# Patient Record
Sex: FEMALE | Race: WHITE | NOT HISPANIC OR LATINO | Employment: UNEMPLOYED | URBAN - METROPOLITAN AREA
[De-identification: names, ages, dates, MRNs, and addresses within clinical notes are randomized per-mention and may not be internally consistent; named-entity substitution may affect disease eponyms.]

---

## 2017-01-10 ENCOUNTER — GENERIC CONVERSION - ENCOUNTER (OUTPATIENT)
Dept: OTHER | Facility: OTHER | Age: 52
End: 2017-01-10

## 2017-01-25 ENCOUNTER — GENERIC CONVERSION - ENCOUNTER (OUTPATIENT)
Dept: OTHER | Facility: OTHER | Age: 52
End: 2017-01-25

## 2017-02-22 ENCOUNTER — GENERIC CONVERSION - ENCOUNTER (OUTPATIENT)
Dept: OTHER | Facility: OTHER | Age: 52
End: 2017-02-22

## 2017-03-22 ENCOUNTER — GENERIC CONVERSION - ENCOUNTER (OUTPATIENT)
Dept: OTHER | Facility: OTHER | Age: 52
End: 2017-03-22

## 2017-06-13 ENCOUNTER — GENERIC CONVERSION - ENCOUNTER (OUTPATIENT)
Dept: OTHER | Facility: OTHER | Age: 52
End: 2017-06-13

## 2017-11-09 ENCOUNTER — GENERIC CONVERSION - ENCOUNTER (OUTPATIENT)
Dept: OTHER | Facility: OTHER | Age: 52
End: 2017-11-09

## 2017-11-20 ENCOUNTER — GENERIC CONVERSION - ENCOUNTER (OUTPATIENT)
Dept: OTHER | Facility: OTHER | Age: 52
End: 2017-11-20

## 2018-01-09 NOTE — PROGRESS NOTES
Medical Alert: Jaw Pain    Frequent Headaches    Other  Medications: Amoxicillin 500mg    Percocet 5mg/325mg    Vicodin 5mg/300mg    Amoxicillin 500mg    Percocet 5mg/325mg    Percocet 5mg/325mg    Percocet 5mg/325mg    Amoxicillin 500mg    Motrin 600 mg    Percocet 5mg/325mg    Amoxicillin 500 MG    Vicodin 5mg/300mg    Peridex 16 oz    Percocet 5mg/325mg    Motrin 600 mg    Amoxicillin 500mg    Motrin 600 mg    Vicodin 5mg/300mg    PreviDent 5000 Booster 1 1 %    Hydrocodone-Acetaminophen 5-325 MG    Amoxicillin 500mg    Peridex 16 oz    Amoxicillin 500 MG    Percocet 5-325 MG    PROPRANOLOL 20 MG TABLET 20 MG    BENZONATATE 200 MG CAPSULE    PROPRANOLOL 40MG TABLETS  Allergies:  Since Last Visit: Medical Alert: No Change    Medications: No Change    Allergies:        No Change  Pain Scale Type: Numeric Pain ScalePain Level: 0  Description:    Patient Health Assessment    Date:            11/20/2017  Blood Pressure:  144/88  Pulse:           68  Age:             47  Weight:          0 lbs  Height/Length:   0' 0"  Body Mass Index: 0 0  Provider:        [email or web address]  Clinic:          [facility]    46 yo female patient presents for stage II implants surgery #4 and #6  Reviewed   PMH- after last appt patient contacted PCP regarding high BP reading, they  sent her to urgent care  Urgent care gave her an Rx for HCTZ and after 4 days  she went to the ER for dehydration and low potassium levels  Since they have  taken her off of HCTZ and increased her dosage of propranolol  Applied topical benzocaine, administered  1 carp 4% articaine 1:100,000 epi via   local infiltration  Crestal incision with #15 blade, FTMPF raised, gingiva  elevated with #9 periosteal elevator  Cover screws #4 and #6 exposed and  removed  Peridex rinse and healing abutment 3 5mm placed and hand torqued  3  Interrupted 3-0 vicryl gut sutures placed  Primary closure achieved with   keratinized tissue on B  Provided verbal POI   Instructed pt to take 600mg  ibuprofen and 500mg tylenol PRN pain  Pt tolerated procedure well, left  satisfied and ambulatory      NV: final impression implant #4 and #6 (4-6 weeks)    / Dr Flor Lackey    ----- Signed on Monday, November 20, 2017 at 12:44:32 PM  -----  ----- Provider: [email or web address] - Resident Three, Dentist -- Clinic: [facility]  -----

## 2018-01-10 NOTE — PROGRESS NOTES
Patient presented for emergency on #8  Patient says she fractured her front  tooth chewing on hard bread  Pa shows multiple fillings on #8  No Caries with  Escobedo Loosen I fracture  I recommended future  crown to protect the tooth  Today  suggested Composite restoration for esthetic reasons  No La, Cleaned and etched   enamel, [place],  Build up with A4 bulk composite  NV placement of implants    MQ    ----- Signed on Tuesday, June 13, 2017 at 9:53:53 AM  -----  ----- Provider: [email or web address] Jourdan Lowery DDS -- Clinic: [facility] -----

## 2018-01-11 NOTE — PROGRESS NOTES
Patient Health Assessment    Date:            01/10/2017  Blood Pressure:  134/91  Pulse:           68  Age:             51  Weight:          0 lbs  Height/Length:   0' 0"  Body Mass Index: 0 0  Provider:        [email or web address]  Cl[facility]: Jaw Pain    Frequent Headaches    Other  Medications: Amoxicillin 500mg    Percocet 5mg/325mg    Vicodin 5mg/300mg    Amoxicillin 500mg    Percocet 5mg/325mg    Percocet 5mg/325mg    Percocet 5mg/325mg    Amoxicillin 500mg    Motrin 600 mg    Percocet 5mg/325mg    Amoxicillin 500 MG    Vicodin 5mg/300mg    Peridex 16 oz    Percocet 5mg/325mg    Motrin 600 mg    Amoxicillin 500mg    Motrin 600 mg    Vicodin 5mg/300mg    PreviDent 5000 Booster 1 1 %    Hydrocodone-Acetaminophen 5-325 MG    Amoxicillin 500mg    Peridex 16 oz    PROPRANOLOL 20 MG TABLET 20 MG  Allergies:  Since Last Visit: Medical Alert: No Change    Medications: No Change    Allergies:        No Change  Pain Scale Type: Numeric Pain ScalePain Level: 0  Description: 47 yr old pt presented for final impression for an implant crown  on tooth #13  removed the healing abutment, screwed the impression  coping,took a PA to confirm good seating and no open gaps  Decided to take a  final impression with heavy body PVS  Pt had sensitivity from extraction  sockets of teeth #5 and 6  Did not like the first impression so I decided to  take another closed-tray final impression with quad-trays to avoid irritation  of the extraction socket  Removed the impression copping,placed it in the  impression to have it delivered to the lab  Placed the healing abutment back to   the implant  bite registration taken with blue-bite  As we were getting  ready to take the shade for the crown pt asked to have the shade matched to the   crown on 7  There is nothing on the previous progress notes about the shade of   #7 because the crown was done somewhere else   Told the pt that we had to use  royal-shade guide to match the color  Pt got upset saying that we should have  known the shade before-hand  Pt got out of the chair being rude and asking  for Dr CRANE to continue with her tx  Told pt to schedule another visit to see Dr Coral Flower  Pt went outside in the waiting area and started cursing    N V another final impression with Dr CRANE?/ pick the shade/bite registration and  alginate for the lower  A Z/Dr Alvaro Saldaña    ----- Signed on Tuesday, January 10, 2017 at 3:58:10 PM  -----  ----- Provider: [email or web address] - Resident Three, Dentist -- Clinic: [facility]  -----

## 2018-01-11 NOTE — PROGRESS NOTES
Patient Health Assessment    Date:            12/27/2016  Blood Pressure:  132/77  Pulse:           88  Age:             51  Weight:          0 lbs  Height/Length:   0' 0"  Body Mass Index: 0 0  Provider:        [email or web address]  Clinic:          [facility]: Jaw Pain    Frequent Headaches    Other  Medications: Amoxicillin 500mg    Percocet 5mg/325mg    Vicodin 5mg/300mg    Amoxicillin 500mg    Percocet 5mg/325mg    Percocet 5mg/325mg    Percocet 5mg/325mg    Amoxicillin 500mg    Motrin 600 mg    Percocet 5mg/325mg    Amoxicillin 500 MG    Vicodin 5mg/300mg    Peridex 16 oz    Percocet 5mg/325mg    Motrin 600 mg    Amoxicillin 500mg    Motrin 600 mg    Vicodin 5mg/300mg    PreviDent 5000 Booster 1 1 %    Hydrocodone-Acetaminophen 5-325 MG    Amoxicillin 500mg    Peridex 16 oz    PROPRANOLOL 20 MG TABLET 20 MG  Allergies:  Since Last Visit: Medical Alert: No Change    Medications: No Change    Allergies:        No Change  Pain Scale Type: Numeric Pain ScalePain Level: 0  Description:    S: Pt presented as emergency  When asked pt how was she doing, she said that  she was good  The area #4-6 was feeling much better and the pain is mild  She  said she wanted to come to follow up and make sure things were good  O: Clinical exam shows are #4-6 healing good  No swelling present  Majority  of sutures had fallen out  Some sutures still in place  No bleeding or any  purulence  A: Normal healing following surgery    P: Advised pt to keep doing the Peridex rinses and keep area clean  Pt agreed  and left in good health  NV: Implant supported crown impression #13 (if pt insist on the "snap on" clear   thermoplastic retainer to replace #4-6 esthetically -then alginates would be  needed )    KRISTINE Irwin    ----- Signed on Tuesday, December 27, 2016 at 9:33:46 AM  -----  ----- Provider: Nathaniel Aguirre -- Clinic: [facility] -----

## 2018-01-12 NOTE — PROGRESS NOTES
Patient presented for the delivery of and essix retainer to act as a temp for  the edentulous area  Delivery was successful  patient was happy  NV implant in July MQ    ----- Signed on Tuesday, June 13, 2017 at 8:34:55 AM  -----  ----- Provider: [email or web address] Raheem Lindsey DDS -- Clinic: [facility] -----

## 2018-01-13 NOTE — PROGRESS NOTES
Patient Health Assessment    Date:            09/20/2016  Blood Pressure:  155/105  Pulse:           90  Age:             51  Weight:          220 lbs  Height/Length:   5' 8"  Body Mass Index: 33 4  Provider:        [email or web address]  Clinic:          [facility]: Jaw Pain    Frequent Headaches    Other  Medications: Amoxicillin 500mg    Percocet 5mg/325mg    Vicodin 5mg/300mg    Amoxicillin 500mg    Percocet 5mg/325mg    Percocet 5mg/325mg    Percocet 5mg/325mg    Amoxicillin 500mg    Motrin 600 mg    Percocet 5mg/325mg    Amoxicillin 500 MG    Vicodin 5mg/300mg    Peridex 16 oz    Percocet 5mg/325mg    Motrin 600 mg    Amoxicillin 500mg  Allergies:  Since Last Visit: Medical Alert: No Change    Medications: No Change    Allergies:        No Change  Pain Scale Type: Numeric Pain ScalePain Level: 0  Description:    Pt presented for alginates for upper interim partial denture  Checked healing  on the upper left quadrant and healing looked good  Few sutures still in place  Removed the sutures  Noticed decay on #10, and notified pt to schedule  appointment so we could excavate  Pt agreed  Took upper and lower alginate impressions and explained pt that we would call  her for the next appointment when case is back from lab  Pt agreed and left  in good health  NV: wax rims for upper interim denture with DR CECELIA Sosa    ----- Signed on Tuesday, September 20, 2016 at 11:13:05 AM  -----  ----- Provider: Alejandro Madera Dentist -- Clinic: [facility] -----

## 2018-01-13 NOTE — PROGRESS NOTES
Patient Health Assessment    Date:            08/23/2016  Blood Pressure:  149/105  Pulse:           80  Age:             50  Weight:          0 lbs  Height/Length:   0' 0"  Body Mass Index: 0 0  Provider:        [email or web address]  Clinic:          [facility]: Jaw Pain    Frequent Headaches    Other  Medications: Amoxicillin 500mg    Percocet 5mg/325mg    Vicodin 5mg/300mg    Amoxicillin 500mg    Percocet 5mg/325mg    Percocet 5mg/325mg    Percocet 5mg/325mg    Amoxicillin 500mg    Motrin 600 mg    Percocet 5mg/325mg    Amoxicillin 500 MG    Vicodin 5mg/300mg  Allergies:  Since Last Visit: Medical Alert: No Change    Medications: No Change    Allergies:        No Change  Pain Scale Type: Numeric Pain ScalePain Level: 0  Description:    Pt presented for #13 Implant placement endosteal  Consent obtained  BP was high   at first 145/105 but went down 5 minutes later  Gave 2 carpules 2% lido 1:100K   epi infiltrations  Full thickness flap done  Used  hole drill and made  the purchase point  Used sequential drills 2 3mm, 2 8mm, 3 4mm  Handscrewed  Sal Screw Taper Implant 4 [id number]  Placed the healing screw and placed 6  Vicryl sutures  Took post op PA and implant placement looked good  Gave post op   instructions and prescribed Amoxicillin, Percocet, Motrin and Peridex  Pt left   satisfied and in good health  Note: Pt will come back to discuss options on #6 crown  Refer to old notes  NV: 3 months follow up for implant healing    KRISTINE Smith    ----- Signed on Tuesday, August 23, 2016 at 10:01:47 AM  -----  ----- Provider: Zahira Torres Dentist -- Clinic: [facility] -----

## 2018-01-13 NOTE — PROGRESS NOTES
Patient Health Assessment    Date:            02/22/2017  Blood Pressure:  130/86  Pulse:           74  Age:             51  Weight:          0 lbs  Height/Length:   5' 8"  Body Mass Index: 0 0  Provider:        MISTY  Clinic:          [facility]: Jaw Pain    Frequent Headaches    Other  Medications: Amoxicillin 500mg    Percocet 5mg/325mg    Vicodin 5mg/300mg    Amoxicillin 500mg    Percocet 5mg/325mg    Percocet 5mg/325mg    Percocet 5mg/325mg    Amoxicillin 500mg    Motrin 600 mg    Percocet 5mg/325mg    Amoxicillin 500 MG    Vicodin 5mg/300mg    Peridex 16 oz    Percocet 5mg/325mg    Motrin 600 mg    Amoxicillin 500mg    Motrin 600 mg    Vicodin 5mg/300mg    PreviDent 5000 Booster 1 1 %    Hydrocodone-Acetaminophen 5-325 MG    Amoxicillin 500mg    Peridex 16 oz    PROPRANOLOL 20 MG TABLET 20 MG  Allergies:  Since Last Visit: Medical Alert: No Change    Medications: No Change    Allergies:        No Change  Pain Scale Type: Numeric Pain ScalePain Level: 0  Description:  Pt presents for delivery abutment and PFM impant-retained crown #14  Removed  healing cap- tissue WNL  Used index to seat abutment and verified full  seating with xray  Seated crown no facial and mesial tissue blanching  present  Slightly adjusted facial normal bite and normal with full force  bite  Plugged abutment screw hole with GP   Cotton roll isolation  Applied  light coating of resin cement and seated  No excess cement  Pt bit hard on  cotton roll for 9 minutes to allow for complete setting  NV   Alginates for a temp (patient is interested in snap on to temp the right  Edentulous area)  VANDANA    ----- Signed on Wednesday, February 22, 2017 at 1:33:11 PM  -----  ----- Provider: MISTY Cloud DDS -- Clinic: [facility] -----

## 2018-01-14 NOTE — PROGRESS NOTES
Patient Health Assessment    Date:            11/02/2016  Blood Pressure:  144/101  Pulse:           94  Age:             51  Weight:          0 lbs  Height/Length:   5' 8"  Body Mass Index: 0 0  Provider:        [email or web address]  Clinic:          [facility]: Jaw Pain    Frequent Headaches    Other  Medications: Amoxicillin 500mg    Percocet 5mg/325mg    Vicodin 5mg/300mg    Amoxicillin 500mg    Percocet 5mg/325mg    Percocet 5mg/325mg    Percocet 5mg/325mg    Amoxicillin 500mg    Motrin 600 mg    Percocet 5mg/325mg    Amoxicillin 500 MG    Vicodin 5mg/300mg    Peridex 16 oz    Percocet 5mg/325mg    Motrin 600 mg    Amoxicillin 500mg  Allergies:  Since Last Visit: Medical Alert: No Change    Medications: No Change    Allergies:        No Change  Pain Scale Type: Numeric Pain ScalePain Level: 0  Description: Pt seen today for loose post/core and crown on #6  Pt aware that  her appliance is here and we can extract #6  Pt states she wants the implant to   be ordered and than the placement done with extraction  pt has scheduled  with her resident   [address] [place] [address]     ----- Signed on Wednesday, November 02, 2016 at 11:16:59 AM  -----  ----- Provider: Nathaniel Bedoya -- Clinic: [facility] -----

## 2018-01-16 NOTE — PROGRESS NOTES
Patient Health Assessment    Date:            08/31/2016  Blood Pressure:  144/101  Pulse:           91  Age:             50  Weight:          0 lbs  Height/Length:   5' 8"  Body Mass Index: 0 0  Provider:        MISTY  Clinic:          [facility]: Jaw Pain    Frequent Headaches    Other  Medications: Amoxicillin 500mg    Percocet 5mg/325mg    Vicodin 5mg/300mg    Amoxicillin 500mg    Percocet 5mg/325mg    Percocet 5mg/325mg    Percocet 5mg/325mg    Amoxicillin 500mg    Motrin 600 mg    Percocet 5mg/325mg    Amoxicillin 500 MG    Vicodin 5mg/300mg    Peridex 16 oz    Percocet 5mg/325mg    Motrin 600 mg    Amoxicillin 500mg  Allergies:  Since Last Visit: Medical Alert: No Change    Medications: No Change    Allergies:        No Change  Pain Scale Type: Numeric Pain ScalePain Level: 0  Description:  Patient presented for emergency complaining that her crown came off  Exam reveals missing crown on #4  Tooth#4  is severely decayed and I  recommended extraction  Today crown recemented with duroleon  tx plan was updated  see Tx Planner  N V  Alginates for prtial temp    MQ    ----- Signed on Wednesday, August 31, 2016 at 11:41:22 AM  -----  ----- Provider: MISTY Andrews DDS -- Clinic: [facility] -----

## 2018-01-16 NOTE — PROGRESS NOTES
Patient Health Assessment    Date:            11/09/2017  Blood Pressure:  167/103  Pulse:           80  Age:             52  Weight:          0 lbs  Height/Length:   0' 0"  Body Mass Index: 0 0  Provider:        [email or web address]  Clinic:          [facility]: Jaw Pain    Frequent Headaches    Other  Medications: Amoxicillin 500mg    Percocet 5mg/325mg    Vicodin 5mg/300mg    Amoxicillin 500mg    Percocet 5mg/325mg    Percocet 5mg/325mg    Percocet 5mg/325mg    Amoxicillin 500mg    Motrin 600 mg    Percocet 5mg/325mg    Amoxicillin 500 MG    Vicodin 5mg/300mg    Peridex 16 oz    Percocet 5mg/325mg    Motrin 600 mg    Amoxicillin 500mg    Motrin 600 mg    Vicodin 5mg/300mg    PreviDent 5000 Booster 1 1 %    Hydrocodone-Acetaminophen 5-325 MG    Amoxicillin 500mg    Peridex 16 oz    Amoxicillin 500 MG    Percocet 5-325 MG    PROPRANOLOL 20 MG TABLET 20 MG    BENZONATATE 200 MG CAPSULE    PROPRANOLOL 40MG TABLETS  Allergies:  Since Last Visit: Medical Alert: No Change    Medications: No Change    Allergies:        No Change  Pain Scale Type: Numeric Pain ScalePain Level: 0  Description:    46 yo female presents for 2nd stage implant surgery  PMH reviewed- since  patient was in last 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 on 1 5 weeks ago  Following up with the opthalmologist in 6 weeks  BP   taken 3x this afternoon  1st reading on R arm: 167/108; waited 5 minutes,  2nd reading on R arm: 175/116; 3rd reading on R arm: 167/103  Determined that  2nd stage implant surgery would not be feasible today and she would need to  come back in the morning for the surgery  Radiograph taken of implants-  parallel  Patient complained that #8 fractured since she had it repaired last  Patient is   aware that the tooth needs to be crowned  Evaluated patient occlusion, #25  slightly extruded and hitting on incisal edge of #8   Recommended to the patient   for #25 to have an occlusal adj and patient refused  Informed patient that I  will replace the lost filling but it is only a temporary fix- she needs a crown   on the tooth once the posterior teeth are stabilized with implant crowns  Patient understood  Etch with 37% H2PO4, rinse, dry  Applied Vividbond with 15 second scrubx2,  gentle air dry and light cured  Restored with Beautifil flowable shade A2  and light cured  Refined with finishing burs, polished with enhance point  Verified occlusion and contacts  #8 is slightly shorter than #9 to prevent  the restoration from fracturing  Showed patient for her approval- she agreed  Pt left satisfied and ambulatory  NV: AM appt for 2nd surgery implants #4 and #6      / Dr Char Ngo    ----- Signed on Thursday, November 09, 2017 at 4:47:10 PM  -----  ----- Provider: [email or web address] - Resident Three, Dentist -- Clinic: [facility]  -----

## 2018-01-16 NOTE — PROGRESS NOTES
Patient Health Assessment    Date:            03/22/2017  Blood Pressure:  120/79  Pulse:           74  Age:             51  Weight:          0 lbs  Height/Length:   5' 8"  Body Mass Index: 0 0  Provider:        [email or web address]  Clinic:          [facility]: Jaw Pain    Frequent Headaches    Other  Medications: Amoxicillin 500mg    Percocet 5mg/325mg    Vicodin 5mg/300mg    Amoxicillin 500mg    Percocet 5mg/325mg    Percocet 5mg/325mg    Percocet 5mg/325mg    Amoxicillin 500mg    Motrin 600 mg    Percocet 5mg/325mg    Amoxicillin 500 MG    Vicodin 5mg/300mg    Peridex 16 oz    Percocet 5mg/325mg    Motrin 600 mg    Amoxicillin 500mg    Motrin 600 mg    Vicodin 5mg/300mg    PreviDent 5000 Booster 1 1 %    Hydrocodone-Acetaminophen 5-325 MG    Amoxicillin 500mg    Peridex 16 oz    PROPRANOLOL 20 MG TABLET 20 MG  Allergies:  Since Last Visit: Medical Alert: No Change    Medications: No Change    Allergies:        No Change  Pain Scale Type: Numeric Pain ScalePain Level: 0  Description:  U/L alginates and bit reg  taken for essex supported temproray to replace #4  and 5  NV delivery of temp    MQ    ----- Signed on Wednesday, March 22, 2017 at 11:02:46 AM  -----  ----- Provider: [email or web address] Anne DDS -- Clinic: [facility] -----

## 2018-01-18 NOTE — PROGRESS NOTES
Patient Health Assessment    Date:            12/19/2016  Blood Pressure:  156/95  Pulse:           84  Age:             51  Weight:          0 lbs  Height/Length:   5' 8"  Body Mass Index: 0 0  Provider:        [email or web address]  Clinic:          [facility]: Jaw Pain    Frequent Headaches    Other  Medications: Amoxicillin 500mg    Percocet 5mg/325mg    Vicodin 5mg/300mg    Amoxicillin 500mg    Percocet 5mg/325mg    Percocet 5mg/325mg    Percocet 5mg/325mg    Amoxicillin 500mg    Motrin 600 mg    Percocet 5mg/325mg    Amoxicillin 500 MG    Vicodin 5mg/300mg    Peridex 16 oz    Percocet 5mg/325mg    Motrin 600 mg    Amoxicillin 500mg    Motrin 600 mg    Vicodin 5mg/300mg    PreviDent 5000 Booster 1 1 %    PROPRANOLOL 20 MG TABLET 20 MG  Allergies:  Since Last Visit: Medical Alert: No Change    Medications: No Change    Allergies:        No Change  Pain Scale Type: Numeric Pain ScalePain Level: 0  Description:    Pt presented for extractions of #4,6 and possible immediate implants placement  BP -WNL and pt takes her meds as prescribed  Gave 2 carpules of 2% Lidocaine 1:100K epi infiltrations  Made full releasing  flap and extracted #4 and 6 without any complications  #6 had periapical  granuloma which was scooped out  DR CRANE also examined the pt and decided that  it was not an ideal situation for immediates implants due to the chronic  infection present and also due to some buccal bone lost during the extractions  Explained to pt that we needed to place the bone graft with the membrane and  close the site and let it heal before placing the implants  Pt was upset that  she didn't get her implants today but agreed with the proposed tx plan  Rinsed socket and placed Puros Cancellous Particulate Allograft size  0 25-1 0mm  Placed [id number] BioMend Absorbable Collagen Membrane and closed  the site using Vicryl Sutures  Relieved the Interim Partial denture so it  wont put any pressure on the site   Interim partial denture did not had a  great fit, retentive though and esthetic pleasing  Explained to pt that it  would be only temporary and only for esthetic purposes till the healing is  done  Pt said she would prefer to have the clear stent instead  Told pt that we   can also do that if she insist  Pt said she would try and use the Interim  partial for now and if she cannot get used to it, then we can make her the  clear stent  Prescribed Antibiotics, Peridex and pain meds  Gave post oral  instructions  Second stage implant surgery  #13    Gave 1 carpule 2% lidocaine 1:100K epi infiltration on the left quadrants  Exposed covering screw for previously placed implant #13 using punching whole  and scalpel  Rinsed with chlorhexidine and placed the healing abutment  Next  visit we would take impression  Pt left in good health  NV: Final impression of implant supported crown #13  Note: if pt request for clear sten interim partial to replace #4,5,6-Take  alginate impression, fabricate clear stent and bring pt back another day for  delivery      KRISTINE Baez    ----- Signed on Monday, December 19, 2016 at 12:15:27 PM  -----  ----- Provider: Padmini Abraham Dentist -- Clinic: [facility] -----

## 2018-01-18 NOTE — PROGRESS NOTES
Medical Alert: Jaw Pain    Frequent Headaches    Other  Medications: Amoxicillin 500mg    Percocet 5mg/325mg    Vicodin 5mg/300mg    Amoxicillin 500mg    Percocet 5mg/325mg    Percocet 5mg/325mg    Percocet 5mg/325mg    Amoxicillin 500mg    Motrin 600 mg    Percocet 5mg/325mg    Amoxicillin 500 MG    Vicodin 5mg/300mg    Peridex 16 oz    Percocet 5mg/325mg    Motrin 600 mg    Amoxicillin 500mg    Motrin 600 mg    Vicodin 5mg/300mg    PreviDent 5000 Booster 1 1 %    Hydrocodone-Acetaminophen 5-325 MG    Amoxicillin 500mg    Peridex 16 oz    PROPRANOLOL 20 MG TABLET 20 MG  Allergies:  Since Last Visit: Medical Alert: No Change    Medications: No Change    Allergies:        No Change  Pain Scale Type: Numeric Pain ScalePain Level: 0  Description:  Patient presented for #14 implant final impression  Used open tray direct tech,   lower impression and bite , Shade A1 sent to [address] [place] [address] Drive lab  N V  delivery of Implant supported crown on #14 implant    MQ    ----- Signed on Wednesday, January 25, 2017 at 12:32:08 PM  -----  ----- Provider: [email or web address] Percy Jacob DDS -- Clinic: [facility] -----

## 2018-01-18 NOTE — PROGRESS NOTES
Patient Health Assessment    Date:            11/14/2016  Blood Pressure:  164/109  Pulse:           124  Age:             51  Weight:          220 lbs  Height/Length:   5' 8"  Body Mass Index: 33 4  Provider:        [email or web address]  Clinic:          [facility]: [facility]    Frequent Headaches    Other  Medications: Amoxicillin 500mg    Percocet 5mg/325mg    Vicodin 5mg/300mg    Amoxicillin 500mg    Percocet 5mg/325mg    Percocet 5mg/325mg    Percocet 5mg/325mg    Amoxicillin 500mg    Motrin 600 mg    Percocet 5mg/325mg    Amoxicillin 500 MG    Vicodin 5mg/300mg    Peridex 16 oz    Percocet 5mg/325mg    Motrin 600 mg    Amoxicillin 500mg  Allergies:  Since Last Visit: Medical Alert: No Change    Medications: No Change    Allergies:        No Change  Pain Scale Type: Numeric Pain ScalePain Level: 0  Description:    Pt presented for extractions of  #4,6 and two immediates implants placement  on #4,6   Blood Pressure was high  First reading 167/111 P 125  Second reading 156/115 P 119    DR Gabriele Rodriguez also examined the pt and decided that it was not safe to perform  the surgery today due to a uncontrolled BP  Pt hasn't been to a physician for a   long time and knows that blood pressure runs in her family  Explained to pt  that we could not do the surgery today because it would not be safe for her due   to a high blood pressure and advised her to go see her physician and get  evaluated to Hypertension  Pt was upset that we didn't do the surgery but  agreed that it was for her own health benefit  Pt said that she was going to  get checked today  Pt left in good health  NV: pt will let us know when her blood pressure is under control to continue  treatment  ***Pt would need a whole morning or afternoon session for the extractions  #4,6 and immediate implants placements  Or SECOND OPTION would be to bring pt  back for ext #4,6 and then place implants after three months healing    I  believe second options would be better since pt cannot wait any longer with  #4,6 in her mouth      KRISTINE Baez    ----- Signed on Monday, November 14, 2016 at 2:55:00 PM  -----  ----- Provider: Rogelio Matta Dentist -- Clinic: [facility] -----

## 2018-01-18 NOTE — PROGRESS NOTES
Patient Health Assessment    Date:            11/29/2016  Blood Pressure:  139/93  Pulse:           73  Age:             51  Weight:          0 lbs  Height/Length:   5' 8"  Body Mass Index: 0 0  Provider:        MISTY  Clinic:          [facility]: [facility]    Frequent Headaches    Other  Medications: Amoxicillin 500mg    Percocet 5mg/325mg    Vicodin 5mg/300mg    Amoxicillin 500mg    Percocet 5mg/325mg    Percocet 5mg/325mg    Percocet 5mg/325mg    Amoxicillin 500mg    Motrin 600 mg    Percocet 5mg/325mg    Amoxicillin 500 MG    Vicodin 5mg/300mg    Peridex 16 oz    Percocet 5mg/325mg    Motrin 600 mg    Amoxicillin 500mg    Motrin 600 mg    Vicodin 5mg/300mg    PROPRANOLOL 20 MG TABLET 20 MG  Allergies:  Since Last Visit: Medical Alert: No Change    Medications: Change    Allergies:        No Change  Pain Scale Type: Numeric Pain ScalePain Level: 0  Description:  Patient presented for Restorative  on #10 and 11  Patient said that her PCP will fax medical consultation    Gave 1 carp 3% Carbo no epi   prep #10 and 11 Vividbond II, Flowable and A2 Bulk Alpha II   NV  implants with Dr Celine Butt    ----- Signed on Tuesday, November 29, 2016 at 9:41:48 AM  -----  ----- Provider: MISTY Mcgovern DDS -- Clinic: [facility] -----

## 2019-03-19 ENCOUNTER — HOSPITAL ENCOUNTER (EMERGENCY)
Facility: HOSPITAL | Age: 54
Discharge: HOME | End: 2019-03-19
Admitting: EMERGENCY MEDICINE

## 2019-03-19 VITALS
BODY MASS INDEX: 38.32 KG/M2 | WEIGHT: 252 LBS | SYSTOLIC BLOOD PRESSURE: 195 MMHG | RESPIRATION RATE: 18 BRPM | DIASTOLIC BLOOD PRESSURE: 100 MMHG | OXYGEN SATURATION: 100 % | TEMPERATURE: 98 F

## 2019-03-19 DIAGNOSIS — Z00.00 NORMAL PHYSICAL EXAM: Primary | ICD-10-CM

## 2019-03-19 LAB
HBV SURFACE AG SER QL: NORMAL
HCV AB SER QL: NORMAL
HIV 1+2 AB+HIV1 P24 AG SERPL QL IA: NORMAL
HIV1 P24 AG SER QL: NORMAL

## 2019-03-19 PROCEDURE — 99283 EMERGENCY DEPT VISIT LOW MDM: CPT

## 2019-03-19 PROCEDURE — 36415 COLL VENOUS BLD VENIPUNCTURE: CPT | Performed by: PHYSICIAN ASSISTANT

## 2019-03-19 PROCEDURE — 87806 HIV AG W/HIV1&2 ANTB W/OPTIC: CPT | Performed by: PHYSICIAN ASSISTANT

## 2019-03-19 PROCEDURE — 87340 HEPATITIS B SURFACE AG IA: CPT | Performed by: PHYSICIAN ASSISTANT

## 2019-03-19 PROCEDURE — 86803 HEPATITIS C AB TEST: CPT | Performed by: PHYSICIAN ASSISTANT

## 2019-03-19 RX ORDER — PROPRANOLOL HYDROCHLORIDE 60 MG/1
60 TABLET ORAL 3 TIMES DAILY
COMMUNITY

## 2019-03-19 RX ORDER — LISINOPRIL 30 MG/1
30 TABLET ORAL DAILY
COMMUNITY

## 2020-11-27 DIAGNOSIS — K12.2 ORAL INFECTION: Primary | ICD-10-CM

## 2020-11-27 RX ORDER — AMOXICILLIN 500 MG/1
500 CAPSULE ORAL EVERY 8 HOURS SCHEDULED
Qty: 21 CAPSULE | Refills: 0 | Status: SHIPPED | OUTPATIENT
Start: 2020-11-27 | End: 2020-12-04

## 2020-11-27 RX ORDER — METRONIDAZOLE 500 MG/1
500 TABLET ORAL EVERY 6 HOURS SCHEDULED
Qty: 28 TABLET | Refills: 0 | Status: SHIPPED | OUTPATIENT
Start: 2020-11-27 | End: 2020-11-27 | Stop reason: SINTOL

## 2021-07-14 ENCOUNTER — OFFICE VISIT (OUTPATIENT)
Dept: DENTISTRY | Facility: CLINIC | Age: 56
End: 2021-07-14

## 2021-07-14 VITALS — DIASTOLIC BLOOD PRESSURE: 87 MMHG | SYSTOLIC BLOOD PRESSURE: 144 MMHG | TEMPERATURE: 97.5 F | HEART RATE: 76 BPM

## 2021-07-14 DIAGNOSIS — Z01.21 ENCOUNTER FOR DENTAL EXAMINATION AND CLEANING WITH ABNORMAL FINDINGS: Primary | ICD-10-CM

## 2021-07-14 PROCEDURE — D0140 LIMITED ORAL EVALUATION - PROBLEM FOCUSED: HCPCS | Performed by: DENTIST

## 2021-07-14 PROCEDURE — D0220 INTRAORAL - PERIAPICAL FIRST RADIOGRAPHIC IMAGE: HCPCS | Performed by: DENTIST

## 2021-07-15 NOTE — PROGRESS NOTES
Pt presents for emergency appointment with cc of temporary crown on #8 is loose  PA taken of #8 as it may need a RCT  PA revealed WNL  Pt does not have sensitivity on tooth nor spontaneous pain  Would like to continue with treatment of final impression  #8 provisional crown cemented with temporary cement  Occlusion adjusted and verified       NV: Final Impression

## 2021-07-16 ENCOUNTER — OFFICE VISIT (OUTPATIENT)
Dept: DENTISTRY | Facility: CLINIC | Age: 56
End: 2021-07-16

## 2021-07-16 VITALS — TEMPERATURE: 97.6 F | DIASTOLIC BLOOD PRESSURE: 82 MMHG | HEART RATE: 76 BPM | SYSTOLIC BLOOD PRESSURE: 119 MMHG

## 2021-07-16 DIAGNOSIS — Z01.21 ENCOUNTER FOR DENTAL EXAMINATION AND CLEANING WITH ABNORMAL FINDINGS: Primary | ICD-10-CM

## 2021-07-16 PROCEDURE — D9430 OFFICE VISIT FOR OBSERVATION (DURING REGULARLY SCHEDULED HOURS) - NO OTHER SERVICES PERFORMED: HCPCS | Performed by: DENTIST

## 2021-07-16 NOTE — PROGRESS NOTES
Pt presents for #8 temporary recement  Temporary came off within a couple hours from last appointment  Cemented provisional with dura-line  Occlusion checked and verified

## 2021-07-22 ENCOUNTER — OFFICE VISIT (OUTPATIENT)
Dept: DENTISTRY | Facility: CLINIC | Age: 56
End: 2021-07-22

## 2021-07-22 VITALS — DIASTOLIC BLOOD PRESSURE: 89 MMHG | SYSTOLIC BLOOD PRESSURE: 125 MMHG | TEMPERATURE: 97.8 F | HEART RATE: 69 BPM

## 2021-07-22 DIAGNOSIS — Z01.20 DENTAL EXAMINATION: Primary | ICD-10-CM

## 2021-07-22 PROCEDURE — D9430 OFFICE VISIT FOR OBSERVATION (DURING REGULARLY SCHEDULED HOURS) - NO OTHER SERVICES PERFORMED: HCPCS | Performed by: DENTIST

## 2021-07-22 NOTE — PROGRESS NOTES
Pt presents with loose provisional crown  Pt would like a more permanent solution  Pt understands and agrees for provisional to be cemented with glass ionomer  Occlusion verified and taken out of occlusion  Margins sealed  NV   Temporary crown removal with bur + build up for crown prep

## 2022-03-17 ENCOUNTER — OFFICE VISIT (OUTPATIENT)
Dept: DENTISTRY | Facility: CLINIC | Age: 57
End: 2022-03-17

## 2022-03-17 VITALS — DIASTOLIC BLOOD PRESSURE: 78 MMHG | TEMPERATURE: 97.1 F | SYSTOLIC BLOOD PRESSURE: 124 MMHG | HEART RATE: 75 BPM

## 2022-03-17 DIAGNOSIS — Z01.20 DENTAL EXAMINATION: Primary | ICD-10-CM

## 2022-03-17 PROCEDURE — WIS1000 RESIDENT TEACHING CASE: Performed by: DENTIST

## 2022-03-17 PROCEDURE — D0191 ASSESSMENT OF A PATIENT: HCPCS | Performed by: DENTIST

## 2022-03-17 NOTE — PROGRESS NOTES
Pt presents with CC " my crown on upper left implant fell off about a week ago"  Dr CRANE and I completed exam and treatment plan together  Updated PAN taken - 12 implant crown is temporary, 8 provisional, and remaining crowns are all permanent  #13 permanent implant crown fell off- custom abutment used  Updated PA taken on 8  9 has mesial decay  Treatment plan options discussed with patient for #8:  1) Palliative endo on #8 followed by crown prep  2) Crown lengthening and final impression on #8  3) Leave prep as is and final impression  4) Extract and implant    Pt opts for option 3  Pt understands risk, benefits with all options  All questions answered  DONE TODAY: Recementation of zirconia implant retained crown on #13    Occlusion verified, BW taken to confirm adequate seating  Cemented with provisa temporary cement and removed all excess cement      NV: remove provisional #8, #9 mesial filling and final impression #8 and #12

## 2022-03-18 ENCOUNTER — OFFICE VISIT (OUTPATIENT)
Dept: DENTISTRY | Facility: CLINIC | Age: 57
End: 2022-03-18

## 2022-03-18 VITALS — HEART RATE: 66 BPM | TEMPERATURE: 97.3 F | SYSTOLIC BLOOD PRESSURE: 86 MMHG | DIASTOLIC BLOOD PRESSURE: 58 MMHG

## 2022-03-18 DIAGNOSIS — Z01.21 ENCOUNTER FOR DENTAL EXAMINATION AND CLEANING WITH ABNORMAL FINDINGS: Primary | ICD-10-CM

## 2022-03-18 PROBLEM — R63.5 ABNORMAL WEIGHT GAIN: Status: ACTIVE | Noted: 2019-04-22

## 2022-03-18 PROBLEM — R73.03 PRE-DIABETES: Status: ACTIVE | Noted: 2019-05-06

## 2022-03-18 PROBLEM — E55.9 VITAMIN D DEFICIENCY: Status: ACTIVE | Noted: 2019-05-06

## 2022-03-18 PROBLEM — R43.2 AGEUSIA: Status: ACTIVE | Noted: 2019-09-13

## 2022-03-18 PROBLEM — G89.4 CHRONIC PAIN DISORDER: Status: ACTIVE | Noted: 2019-04-22

## 2022-03-18 PROBLEM — K59.09 OTHER CONSTIPATION: Status: ACTIVE | Noted: 2021-02-17

## 2022-03-18 PROBLEM — F33.2 SEVERE EPISODE OF RECURRENT MAJOR DEPRESSIVE DISORDER, WITHOUT PSYCHOTIC FEATURES (HCC): Status: ACTIVE | Noted: 2022-02-21

## 2022-03-18 PROBLEM — F43.21 GRIEF: Status: ACTIVE | Noted: 2022-02-23

## 2022-03-18 PROBLEM — R26.2 AMBULATORY DYSFUNCTION: Status: ACTIVE | Noted: 2019-04-22

## 2022-03-18 PROBLEM — M05.79 RHEUMATOID ARTHRITIS INVOLVING MULTIPLE SITES WITH POSITIVE RHEUMATOID FACTOR (HCC): Status: ACTIVE | Noted: 2020-02-25

## 2022-03-18 PROBLEM — E78.2 MIXED HYPERLIPIDEMIA: Status: ACTIVE | Noted: 2019-05-06

## 2022-03-18 PROBLEM — R05.9 COUGH: Status: ACTIVE | Noted: 2019-08-12

## 2022-03-18 PROBLEM — N84.0 ENDOMETRIAL POLYP: Status: ACTIVE | Noted: 2019-10-29

## 2022-03-18 PROBLEM — Z53.20 MAMMOGRAM DECLINED: Status: ACTIVE | Noted: 2019-04-22

## 2022-03-18 PROBLEM — Z53.20 COLONOSCOPY REFUSED: Status: ACTIVE | Noted: 2019-04-22

## 2022-03-18 PROBLEM — N90.89 VULVAR LESION: Status: ACTIVE | Noted: 2019-10-29

## 2022-03-18 PROCEDURE — D1110 PROPHYLAXIS - ADULT: HCPCS

## 2022-03-18 PROCEDURE — D0120 PERIODIC ORAL EVALUATION - ESTABLISHED PATIENT: HCPCS

## 2022-03-18 RX ORDER — LISINOPRIL 20 MG/1
TABLET ORAL
COMMUNITY

## 2022-03-18 RX ORDER — POLYETHYLENE GLYCOL 3350 17 G/17G
POWDER, FOR SOLUTION ORAL
COMMUNITY
Start: 2022-01-23

## 2022-03-18 RX ORDER — HYDROXYZINE HYDROCHLORIDE 25 MG/1
TABLET, FILM COATED ORAL
COMMUNITY
Start: 2022-01-19

## 2022-03-18 RX ORDER — AMLODIPINE BESYLATE 5 MG/1
TABLET ORAL
COMMUNITY
Start: 2022-01-29

## 2022-03-18 RX ORDER — ASPIRIN 81 MG/1
TABLET ORAL
COMMUNITY

## 2022-03-18 RX ORDER — ASPIRIN 81 MG/1
TABLET, CHEWABLE ORAL
COMMUNITY

## 2022-03-18 RX ORDER — LIDOCAINE 40 MG/G
CREAM TOPICAL
COMMUNITY

## 2022-03-18 RX ORDER — AMLODIPINE BESYLATE 5 MG/1
5 TABLET ORAL DAILY
COMMUNITY
Start: 2021-10-28

## 2022-03-18 NOTE — PROGRESS NOTES
Prophy    Dental procedures in this visit     - PERIODIC ORAL EVALUATION - ESTABLISHED PATIENT (Completed)     Service provider: Josey Babin DMD     Billing provider: Dain Matos DDS     - PROPHYLAXIS - ADULT (Completed)     Service provider: Laura Hayden     Billing provider: Dain Matos DDS       CC: None  Reviewed Medical History  ASA: II  Translation line used: no    Method Used:  · Prophy Method Used: Hand Scaling  · Polished  · Flossed    Radiographs Taken:  · None    Intra/Extra Oral Cancer Screening:  · Within normal limits      Oral Hygiene:  · Fair    Plaque:  · Localized  · Light    Calculus:  · Localized  · Light  · Lower anteriors    Bleeding:  · Bleeding on probin 59%  · Localized  · Light    Stain:  · None    Periodontal Charting:  · Full probing    Periodontal Classification:  · Localized  · Moderate  · Periodontal Disease      Nutritional Counseling:  · N/A    OHI: Stressed importance of brushing 2x/day and flossing daily  Recommended daily mouthrinse  Pt is currently brushing 1-2x/day  Uses [facility]  Laura Hayden, [facility]     No orders of the defined types were placed in this encounter  Periodic Exam:  Dr Zayra Shaver did exam:    Decay present: #21-DF, #27-DL  Recommend filling after crowns on #8, 12 are completed  See previous notes regarding tx on #8, 12  OCS-neg    Pt dismissed in good health, no complications and all questions answered  NV: remove provisional #8, #9 mesial filling and final impression #8 and #12  NV2: 6 mrc, periodic exam  60 mins with hygiene

## 2022-03-30 ENCOUNTER — OFFICE VISIT (OUTPATIENT)
Dept: DENTISTRY | Facility: CLINIC | Age: 57
End: 2022-03-30

## 2022-03-30 VITALS — DIASTOLIC BLOOD PRESSURE: 76 MMHG | HEART RATE: 72 BPM | SYSTOLIC BLOOD PRESSURE: 114 MMHG | TEMPERATURE: 97.9 F

## 2022-03-30 DIAGNOSIS — Z01.20 DENTAL EXAMINATION: Primary | ICD-10-CM

## 2022-03-30 PROCEDURE — D2331 RESIN-BASED COMPOSITE - 2 SURFACES, ANTERIOR: HCPCS | Performed by: DENTIST

## 2022-03-30 PROCEDURE — WIS2001 FINAL IMPRESSION - CROWN OR IMPLANT: Performed by: DENTIST

## 2022-03-30 NOTE — PROGRESS NOTES
Pt presents for continuation of dental treatment  #8 provisional crown removed with hemostat  Cement cleaned off  Gingival interproximal hyperplastic and overgrown over margins  Dr Lori Aly explained to patient: laser or electrosurge needed to remove hyperplastic tissue  However, due to close proximity of bone either extrusion or crown lengthening would be needed  Due to personal life problems, patient was having a bad day and was continuously crying throughout procedure  Recemented provisional crown with temporary cement and adjusted occulsion  9 ML filling completed today    Applied topical benzocaine, administered 2 carps of septocaine  Prepped tooth #9 ML with 245 carbide on high speed  Caries removed with round carbide on slow speed  Isolation with cotton rolls and dri-angles    Etch with 37% H2PO4, rinse, dry  Applied Adhese with 20 second scrub once, gentle air dry and light cured for 10s  Restored with Tetric bulk johnna shade A2 and light cured  Refined with finishing burs, polished with enhance point  Verified occlusion and contacts  Implant [place] Impression  12 temporary implant crown removed and soaked in chlorhexidine  Impression coping placed and PA taken to verify seating  Impression taken with stock tray with light body + heavy body  After patient left, Dr Lori Aly and I decided that implant impression not sturdy with impression coping in place  Custom tray will be needed for final impression  Sent out to lab  Recemented provisional crown with hand screwdriver  Kooldam placed and light cured  Composite in remaining access hole and light cured  Polished and refined      NV: Implant impression with custom tray

## 2022-04-05 ENCOUNTER — OFFICE VISIT (OUTPATIENT)
Dept: DENTISTRY | Facility: CLINIC | Age: 57
End: 2022-04-05

## 2022-04-05 VITALS — DIASTOLIC BLOOD PRESSURE: 76 MMHG | HEART RATE: 56 BPM | TEMPERATURE: 98 F | SYSTOLIC BLOOD PRESSURE: 114 MMHG

## 2022-04-05 DIAGNOSIS — Z46.3: Primary | ICD-10-CM

## 2022-04-05 PROCEDURE — D0191 ASSESSMENT OF A PATIENT: HCPCS | Performed by: DENTIST

## 2022-04-05 NOTE — PROGRESS NOTES
57yo F presents for cementation of #8 provisional which dislodged  PMH reviewed  BP WNL  Pt brings temporary with her today  #8 cleaned off with scalers and prophy/brush  Provisional cleaned  Cotton roll isolation  Cemented with Tempbond  Post-cementation instructions reviewed  Pt advised to have definitive restoration placed as she mentioned she had been wearing provisional for 7 months at one point  NV: final impression #8 crown w/ Dr Serena Amin and/or continuation of restorative treatment based on pt's wishes

## 2022-04-07 ENCOUNTER — OFFICE VISIT (OUTPATIENT)
Dept: DENTISTRY | Facility: CLINIC | Age: 57
End: 2022-04-07

## 2022-04-07 VITALS — SYSTOLIC BLOOD PRESSURE: 113 MMHG | DIASTOLIC BLOOD PRESSURE: 75 MMHG | HEART RATE: 83 BPM | TEMPERATURE: 95.5 F

## 2022-04-07 DIAGNOSIS — K08.50 DEFECTIVE DENTAL RESTORATION: Primary | ICD-10-CM

## 2022-04-07 PROCEDURE — D0191 ASSESSMENT OF A PATIENT: HCPCS | Performed by: DENTIST

## 2022-04-07 NOTE — PROGRESS NOTES
Emergency  S: CC: "My temp crown is loose"  Pt identifies #8 and has temp crown with her at today's visit  O: Temporary crown #8 most recently placed on 4/5/22 has fallen off  Note prep has adequate reduction w/ moderate taper and short incisal height  A: #8 and 9: Temp fallen off  P: Crown was not polished and dull in color  Temp polished with ASAP polishing discs  Cemented provisional crown using Duralon  Removed excess cement, occlusion and contacts verified  Pt left satisfied and ambulatory  NV: Continue crown preps and final impression

## 2022-04-21 ENCOUNTER — OFFICE VISIT (OUTPATIENT)
Dept: DENTISTRY | Facility: CLINIC | Age: 57
End: 2022-04-21

## 2022-04-21 VITALS — HEART RATE: 70 BPM | SYSTOLIC BLOOD PRESSURE: 103 MMHG | DIASTOLIC BLOOD PRESSURE: 69 MMHG | TEMPERATURE: 96.9 F

## 2022-04-21 DIAGNOSIS — Z01.21 ENCOUNTER FOR DENTAL EXAMINATION AND CLEANING WITH ABNORMAL FINDINGS: Primary | ICD-10-CM

## 2022-04-21 PROCEDURE — D0191 ASSESSMENT OF A PATIENT: HCPCS | Performed by: DENTIST

## 2022-04-21 NOTE — PROGRESS NOTES
Patient presented to the clinic with chief complain that her crown on tooth #8 is loose  Temporary crown was removed and existing cement was removed from the crown and the tooth  Tooth was isolated and Durelon cement was used to cement the crown  Excess cement was removed using floss and explorer  Patient left in stable condition

## 2022-04-27 ENCOUNTER — OFFICE VISIT (OUTPATIENT)
Dept: DENTISTRY | Facility: CLINIC | Age: 57
End: 2022-04-27

## 2022-04-27 VITALS — HEART RATE: 70 BPM | DIASTOLIC BLOOD PRESSURE: 73 MMHG | TEMPERATURE: 97.5 F | SYSTOLIC BLOOD PRESSURE: 109 MMHG

## 2022-04-27 DIAGNOSIS — Z01.20 DENTAL EXAMINATION: Primary | ICD-10-CM

## 2022-04-27 PROCEDURE — D9430 OFFICE VISIT FOR OBSERVATION (DURING REGULARLY SCHEDULED HOURS) - NO OTHER SERVICES PERFORMED: HCPCS | Performed by: DENTIST

## 2022-04-27 NOTE — PROGRESS NOTES
Emergency  Pt presents for emergency visit  Her temp on #8 is loose  Took off temp- cleaned off cement and recommended with [place]  Occlusion verified and pt states feels normal      Due to the recurrent visits- recommended patient to go with RCT/Post Core and [place] on #8 rather than clinical crown lengthening or ortho extrusion  Pt content with this recommendation and would like to proceed with treatment  Dr Anton Murdock approved endo+post/core probono for patient as a resident teaching case  All other questions answered      NV: RCT #8

## 2022-05-18 ENCOUNTER — OFFICE VISIT (OUTPATIENT)
Dept: DENTISTRY | Facility: CLINIC | Age: 57
End: 2022-05-18

## 2022-05-18 VITALS — HEART RATE: 73 BPM | SYSTOLIC BLOOD PRESSURE: 116 MMHG | TEMPERATURE: 97.1 F | DIASTOLIC BLOOD PRESSURE: 73 MMHG

## 2022-05-18 DIAGNOSIS — K08.409 TOOTH MISSING: Primary | ICD-10-CM

## 2022-05-18 PROCEDURE — D9430 OFFICE VISIT FOR OBSERVATION (DURING REGULARLY SCHEDULED HOURS) - NO OTHER SERVICES PERFORMED: HCPCS | Performed by: DENTIST

## 2022-05-18 NOTE — PROGRESS NOTES
Pt reports to begin #8 RCT  However, no treatment was rendered today  PMH reviewed w/ NSC  Pt denies any dental pain today  Pt expressed significant confusion and frustration related to the speed and direction of her treatment  She states that a woman at the  told her that today her implant crown #12 was in from lab and ready to deliver  Additionally, she stated that she cannot believe that an RCT is now needed to put a crown on #8 and she wants to understand why  The pt generally stated that she feels her case is taking way too long and that she is worried about the two additional fillings getting bigger that also have not been done  This provider reviewed case notes and available lab work  All findings were explained to pt  Pt was informed that #12 implant crown final impression was attempted but aborted bc of need for custom tray to get ideal impression  The custom tray is in from lab and Dr Jesse Frye confirmed that it will be adequate to capture impression at future visit  Pt was informed that it will take 2 more visits to deliver this crown - Dr Jesse Frye would like to proceed with this immediately rather than wait until #8 is ready for final impression  Additionally, this provider explained that RCT #8 is needed so a post/core can be placed to give the crown prep #8 adequate bulk  Pt understands but feels like this was not fully explained previously  She states that she is disappointed to hear this bc she has had bad luck with RCT's in the past, but if it has to be done to get a crown on #8, then she would like to proceed immediately  Dr Jesse Frye once again reminds pt that this part of the treatment is a NO CHARGE teaching case  The  reached out to Dr Vasquez Funk to see if HP could be used to schedule #12 final impression        NV: begin RCT #8 for eventual post/core and crown    NV2: final impression for implant crown #12 (custom tray is in)    MAKE SURE IMPLANT IMPRESSION parts are ready to go

## 2022-06-15 ENCOUNTER — OFFICE VISIT (OUTPATIENT)
Dept: DENTISTRY | Facility: CLINIC | Age: 57
End: 2022-06-15

## 2022-06-15 VITALS — HEART RATE: 88 BPM | TEMPERATURE: 98 F | SYSTOLIC BLOOD PRESSURE: 128 MMHG | DIASTOLIC BLOOD PRESSURE: 78 MMHG

## 2022-06-15 DIAGNOSIS — Z01.20 DENTAL EXAMINATION: Primary | ICD-10-CM

## 2022-06-15 PROCEDURE — WIS1000 RESIDENT TEACHING CASE: Performed by: DENTIST

## 2022-06-15 PROCEDURE — D3310 ENDODONTIC THERAPY, ANTERIOR TOOTH (EXCLUDING FINAL RESTORATION): HCPCS | Performed by: DENTIST

## 2022-06-15 NOTE — PROGRESS NOTES
RCT 8 - Single Visit    Ladarius Clarity presents for RCT #8  PMH reviewed, no changes  Tooth #8 percussion (-) palpation (-)  Pulp vital via cold test  Elective endo needed for corebuild up for better retention of future crown  Written consent obtained  Applied topical benzocaine, administered 1 carps 4% articaine 1:100k epi via infiltration   Removed provisional crown  Clamp and rubber dam placed  Pulp chamber accessed with round carbide  Working length determined with apex locater  WL: 18 mm    Hand file then rotary filed with waveone proglider and small waveone file with NaOCL irrigation and RC prep  PA taken with small cold point lorin percha  Re-instrumented with wave one to 19 mm and took new PA with lorin percha  New WL: 19 mm    Dried canal with paper points, placed bioceramic sealer with / paper point  Obturated canal with small cold point lorin percha  System B used to removed excess lorin percha  Restored with Cavit and occlusion verified  Re-cemented provisional crown with Durelon and Vaseline  Obtained PA radiograph  Obturation is dense with no porosities and filled to apex, minor sealer extrusion present  Pt left ambulatory and satisfied  Final PA confirmed with Dr Belem To       NV: Post/Core on #8

## 2022-08-05 ENCOUNTER — OFFICE VISIT (OUTPATIENT)
Dept: DENTISTRY | Facility: CLINIC | Age: 57
End: 2022-08-05

## 2022-08-05 VITALS — TEMPERATURE: 97.2 F | HEART RATE: 80 BPM | SYSTOLIC BLOOD PRESSURE: 137 MMHG | DIASTOLIC BLOOD PRESSURE: 87 MMHG

## 2022-08-05 DIAGNOSIS — K08.409 TOOTH MISSING: Primary | ICD-10-CM

## 2022-08-05 PROCEDURE — WIS2001 FINAL IMPRESSION - CROWN OR IMPLANT: Performed by: DENTIST

## 2022-08-05 NOTE — PROGRESS NOTES
Final Impression for Implant #12    Oral Mage presented to [facility] for final impression for implant #12  PMH reviewed, no changes  Pt stated "if anything gets in the back of my throat, I'm going to rip it out and throw up "    Implant #12 is a Sal implant 3 7x8 mm  Custom tray tried in pt's mouth  Vent holes created in tray to expel excess material  Pt verified shade A1 with mirror     Provisional crown removed  Screw access is on the facial of #12  Open tray impression coping fully seated and screwed in  PA radiograph confirmed fully seated  Cotton pellet placed in screw access  Open tray final impression taken with heavy body and light body  No complications with impression  Pt already had counter arch model  Bite registration taken  Provisional crown screwed in  Screw access covered with luis e tape  Flowable composite placed and light cured  PA radiograph confirmed seating of provisional crown  Pt told post and core #8 for next visit  Pt left satisfied and ambulatory       NV: post and core #8

## 2022-08-09 ENCOUNTER — OFFICE VISIT (OUTPATIENT)
Dept: DENTISTRY | Facility: CLINIC | Age: 57
End: 2022-08-09

## 2022-08-09 DIAGNOSIS — K02.9 DENTAL DECAY: Primary | ICD-10-CM

## 2022-08-09 PROCEDURE — D2954 PREFABRICATED POST AND CORE IN ADDITION TO CROWN: HCPCS | Performed by: DENTIST

## 2022-08-09 NOTE — PROGRESS NOTES
Post and Core #8    Trygve Bump presented to [address] [place] [address] for post and core #8  PMH reviewed, no changes  ASA 2  0/10 pain    PA #8 taken  RCT was narrow  Very small amount of coronal tooth structure remaining  Attempted to take a new bite for provisional in triple tray  Pt refused  Adjusted bite that was previously taken and fit in pt's mouth  Provisional crown removed  No rubber dam placed due to lack of tooth structure remaining  Isolation achieved with cotton rolls  Temporary restorative material removed and GP accessed with 330 carbide  Size 3 Becerra Higgins bur used to access canal from incisal edge of prepped tooth (leaving 6 mm of GP at apex)  Radiograph taken to confirm post space  Unable to take post to length due to lost post drill, but final preparation was retentive for fiber post      Etched tooth and fiber post with 37% phosphoric acid  Rinsed and air dried  Applied Multicore adhesive to tooth and post with 20 second scrub once, gentle air dry and light cured for 10s  Applied Multicore to canal and fiber post  Fiber post placed into canal and build-up material placed around post  Light cured  Applied topical benzocaine, administered 0 25 carpules of 2% lido 1:100k epi via buccal infiltration as patient started having sensitivity near gingival margin  Tooth re-prepped for crown  Preparation is subgingival  New provisional fabricated with Integrity  Cemented with Provisa and polished  [place] applied for esthetics  Pt approved with hand mirror  Explained to pt using hand mirror that her lower anterior teeth are not all the same height and occluding on upper anterior teeth   Explained to pt that any crown we put on #8 may fracture due to this bite and lack of space for restorative material  Suggested polishing lower anteriors - pt was apprehensive to polishing lower anteriors at beginning of appt, but towards the end she was open towards the treatment - will discuss at future appt  Pt stated she wanted shade A1 - will verify at next visit  Explained to pt this tooth has a guarded prognosis due to amount of tooth structure left and occlusion  Pt understood and left ambulatory      NV: take post op radiograph, final impression for #8, will need to make a new provisional (need 2 hours on Tuesday), verify shade of crown, possibly polish lower anteriors (if pt allows)

## 2022-08-10 ENCOUNTER — OFFICE VISIT (OUTPATIENT)
Dept: DENTISTRY | Facility: CLINIC | Age: 57
End: 2022-08-10

## 2022-08-10 VITALS — TEMPERATURE: 69.9 F | HEART RATE: 75 BPM | SYSTOLIC BLOOD PRESSURE: 135 MMHG | DIASTOLIC BLOOD PRESSURE: 84 MMHG

## 2022-08-10 DIAGNOSIS — K08.50 DEFECTIVE DENTAL RESTORATION: Primary | ICD-10-CM

## 2022-08-10 PROCEDURE — D0191 ASSESSMENT OF A PATIENT: HCPCS | Performed by: DENTIST

## 2022-08-10 NOTE — PROGRESS NOTES
Yoly Martin presents to clinic for emergency appt  Pt  Medical history reviewed: no changes  Pt  Is ASA2  Pt  Reports no pain today but is experiencing discomfort due to excess composite irritating her upper lip and feels the provisional that was made yesterday is high  Checked pt  Intraorally and saw excess flowable composite in the interproximal between 7-8 that was causing irritation to the patient  Removed this excess with an explorer and confirmed pt  Comfort  Checked occlusion with articulating paper on lingual of #8 provisional and saw where pt  As experiencing discomfort  Reduced the area on the direct lingual with a high speed and football bur  Noticed the provisional was very thin in the lingual of the provisional: noted for NV that lingual of preparation needs reduction  Pt  Confirmed comfort and left satisfied      NV: take post op radiograph, final impression for #8, will need to make a new provisional (need 2 hours on Tuesday), verify shade of crown, possibly polish lower anteriors (if pt allows)

## 2022-09-20 ENCOUNTER — OFFICE VISIT (OUTPATIENT)
Dept: DENTISTRY | Facility: CLINIC | Age: 57
End: 2022-09-20

## 2022-09-20 VITALS — HEART RATE: 73 BPM | DIASTOLIC BLOOD PRESSURE: 85 MMHG | SYSTOLIC BLOOD PRESSURE: 133 MMHG

## 2022-09-20 DIAGNOSIS — Z01.21 ENCOUNTER FOR DENTAL EXAMINATION AND CLEANING WITH ABNORMAL FINDINGS: Primary | ICD-10-CM

## 2022-09-20 PROBLEM — E66.812 CLASS 2 OBESITY: Status: ACTIVE | Noted: 2019-06-12

## 2022-09-20 PROBLEM — E66.9 CLASS 2 OBESITY: Status: ACTIVE | Noted: 2019-06-12

## 2022-09-20 PROBLEM — F51.04 PSYCHOPHYSIOLOGICAL INSOMNIA: Status: ACTIVE | Noted: 2022-07-27

## 2022-09-20 PROBLEM — Z13.9 ENCOUNTER FOR SCREENING INVOLVING SOCIAL DETERMINANTS OF HEALTH (SDOH): Status: ACTIVE | Noted: 2022-03-23

## 2022-09-20 PROCEDURE — D0274 BITEWINGS - 4 RADIOGRAPHIC IMAGES: HCPCS

## 2022-09-20 PROCEDURE — D1110 PROPHYLAXIS - ADULT: HCPCS

## 2022-09-20 RX ORDER — BUPROPION HYDROCHLORIDE 450 MG/1
450 TABLET, FILM COATED, EXTENDED RELEASE ORAL DAILY
COMMUNITY
Start: 2022-06-29

## 2022-09-20 RX ORDER — BUPROPION HYDROCHLORIDE 450 MG/1
TABLET, FILM COATED, EXTENDED RELEASE ORAL
COMMUNITY
Start: 2022-06-30

## 2022-09-20 RX ORDER — LIDOCAINE 50 MG/G
PATCH TOPICAL
COMMUNITY
Start: 2022-07-27

## 2022-09-20 NOTE — PROGRESS NOTES
Prophy    Dental procedures in this visit     - PERIODIC ORAL EVALUATION - ESTABLISHED PATIENT (Completed)     Service provider: Corinne Dejesus DMD     Billing provider: Rosemary Knight DDS     - PROPHYLAXIS - ADULT (Completed)     Service provider: Tanna Chan     Billing provider: Rosemary Knight DDS     - BITEWINGS - 4 RADIOGRAPHIC IMAGES (Completed)     Service provider: Tanna Chan     Billing provider: Rosemary Knight DDS      Completion details     - PERIODIC ORAL EVALUATION - ESTABLISHED PATIENT (Completed)     - PROPHYLAXIS - ADULT (Completed)     - BITEWINGS - 4 RADIOGRAPHIC IMAGES (Completed)    See notes           CC:   Reviewed Medical History  ASA: II  Translation line used: no    Method Used:  · Prophy Method Used: Hand Scaling  · Polished  · Flossed    Radiographs Taken:  · Bitewings x4    Intra/Extra Oral Cancer Screening:  · Within normal limits      Oral Hygiene:  · Good    Plaque:  · Generalized  · Light    Calculus:  · None    Bleeding:  · Bleeding on probing: No periodontal exam for this visit  · Localized  · Light    Stain:  · None    Periodontal Charting:  · Time did not allow for perio charting      Nutritional Counseling:  · N/A    OHI: Stressed importance of brushing 2x/day and flossing daily  Recommended daily mouthrinse  Pt is currently brushing 2x/day with [facility] and flossing 1x/day  Tanna Chan [facility]     No orders of the defined types were placed in this encounter  Periodic Exam:  Dr Tayler Yu did exam:    Decay present: previously tx planned for fillings #21-DBL and #27-DL    Pt must continue with implant restorations as per last clinical note  OCS-neg    Pt dismissed in good health, no complications and all questions answered       NV: #12 implant try in/delivery   NV2: final impression #8 (need 2 hours)  NV3: #20 DBL restoration  NV4: #27 DL restoration  NV5: 6mrc, periodic exam with hygiene

## 2022-09-23 ENCOUNTER — OFFICE VISIT (OUTPATIENT)
Dept: DENTISTRY | Facility: CLINIC | Age: 57
End: 2022-09-23

## 2022-09-23 VITALS — DIASTOLIC BLOOD PRESSURE: 78 MMHG | SYSTOLIC BLOOD PRESSURE: 117 MMHG | HEART RATE: 68 BPM

## 2022-09-23 DIAGNOSIS — K08.409 TOOTH MISSING: Primary | ICD-10-CM

## 2022-09-23 PROCEDURE — D6065: HCPCS | Performed by: DENTIST

## 2022-09-23 NOTE — PROGRESS NOTES
Implant [place] #12 Delivery    [place] Esters presented to [facility] for implant crown #12 delivery  PMH reviewed, no changes       ASA: 2  Pain: 0/10     Removed provisional crown  Placed abutment and screwed in  PA radiograph confirmed seating  Tried implant crown on abutment  Had pt apply pressure by biting on cotton roll  Checked margins  PA radiograph confirmed seating of implant crown on abutment  Contacts checked and sufficient  Occlusion checked  Pt was happy with bite and felt it was normal  Pt approved of shade and esthetics in mirror  Torqued abutment to 35 Ncm       Placed luis e tape in access hole  Cemented implant crown with [employer] cement  Had pt bite on cotton roll for 2 mins  Removed excess cement  Verified occlusion  Informed pt tissue was expanded on palate due to provisional being larger than final restoration  Informed pt the tissue will adjust to new implant restoration, but to keep the area very clean in the meantime  Advised rinsing with warm salt water or listerine after meals  Pt understood   Pt left ambulatory and satisfied       NV: #8 final impression (take a radiograph to start)  NV2: [place]in restorations (#21 and #27)

## 2022-10-11 ENCOUNTER — OFFICE VISIT (OUTPATIENT)
Dept: DENTISTRY | Facility: CLINIC | Age: 57
End: 2022-10-11

## 2022-10-11 VITALS — DIASTOLIC BLOOD PRESSURE: 77 MMHG | SYSTOLIC BLOOD PRESSURE: 123 MMHG | HEART RATE: 74 BPM | TEMPERATURE: 97.3 F

## 2022-10-11 DIAGNOSIS — K02.9 DENTAL DECAY: Primary | ICD-10-CM

## 2022-10-11 PROBLEM — R05.9 COUGH: Status: RESOLVED | Noted: 2019-08-12 | Resolved: 2022-10-11

## 2022-10-11 PROCEDURE — D2331 RESIN-BASED COMPOSITE - 2 SURFACES, ANTERIOR: HCPCS | Performed by: DENTIST

## 2022-10-11 NOTE — PROGRESS NOTES
Composite Restoration #27 DL    Chelsey Nur presents for composite restoration #27 DL  PMH reviewed, no changes  ASA II  Pain 0/10  Discussed with patient need for RCT if pulp exposure occurs or in future if pulp is inflamed  Pt understands and consents  Applied topical benzocaine, administered 1 carpule 4% articaine 1:100k epi via buccal and lingual infiltration  Prepped tooth #27 DL with 245 carbide on high speed  Placed size 0 cord soaked in hemodent  Caries removed with round carbide on slow speed  Placed mylar matrix matrix  Isolation with cotton rolls  Etch with 37% H2PO4, rinse, dry  Applied Adhese with 20 second scrub once, gentle air dry and light cured for 10s  Restored with Tetric bulk johnna shade A1 and light cured  Refined with finishing burs, polished with enhance point  Verified contacts  Informed pt the cavity/restoration is large and we will monitor for any symptoms  Pt left ambulatory and satisfied      NV: #8 crown final impression

## 2022-12-01 ENCOUNTER — OFFICE VISIT (OUTPATIENT)
Dept: DENTISTRY | Facility: CLINIC | Age: 57
End: 2022-12-01

## 2022-12-01 VITALS — HEART RATE: 67 BPM | DIASTOLIC BLOOD PRESSURE: 82 MMHG | TEMPERATURE: 97.8 F | SYSTOLIC BLOOD PRESSURE: 130 MMHG

## 2022-12-01 DIAGNOSIS — K08.59 FULL RESTORATION OF CROWN OF TOOTH NEEDED DUE TO PREVIOUS LARGE RESTORATION PROCEDURE: Primary | ICD-10-CM

## 2022-12-01 NOTE — PROGRESS NOTES
#8 Evaluation    Vern Rodriguez presented to [facility] for #8 final impression  PMH reviewed, no changes  ASA: II  Pain: 0/10    Pt states she has not had issues with the provisional crown except that it is not her shade  Pt prefers A1 shade  Explained to pt we do not have A1 temporary provisional material  Lingual of provisional was eroded due to occlusion on provisional      Took putty and triple tray blue bite for matrix to fabricate new provisional if necessary  Applied topical benzocaine, administered 1 carpule 4% articaine 1:100k epi via buccal and palatal infiltration  Removed temporary crown  Gingiva was inflamed  Core material was not sufficient  Incisal reduction was not sufficient  Dr Kanwal Dukes evaluated preparation - needed more of a defined margin/was not enough incisal reduction; crown lengthening is necessary to have enough tooth structure to support crown  PA radiograph taken  Limited amount of core build up material      Explained to pt that there is limited amount of tooth structure and putting a crown on the tooth as it is now has a poor prognosis  Pt was upset and wanted to speak with Dr Corina Das; Dr Corina Das was unavailable at this time  Pt is also upset with length of time in between appointments and how long treatment is taking  Pt stated ever since this place was taken over by [facility] it has gotten worse  Explained to pt that I am not in control of the schedule, but will do our best      Re-cemented provisional crown with Provisa temporary cement  Added flowable composite on lingual  Removed excess cement  Provide pt with POI  Pt left ambulatory       NV: DBL resin #21

## 2022-12-02 ENCOUNTER — OFFICE VISIT (OUTPATIENT)
Dept: DENTISTRY | Facility: CLINIC | Age: 57
End: 2022-12-02

## 2022-12-02 VITALS — SYSTOLIC BLOOD PRESSURE: 155 MMHG | DIASTOLIC BLOOD PRESSURE: 45 MMHG | TEMPERATURE: 97.4 F | HEART RATE: 59 BPM

## 2022-12-02 DIAGNOSIS — K02.9 DENTAL DECAY: Primary | ICD-10-CM

## 2022-12-02 NOTE — PROGRESS NOTES
Composite Filling    Yolanda Shakila presents for composite restoration #21 MODB  PMH reviewed, no changes  Dr Hamlet Lam evaluated decay  Decay extends deep and restoration will need marginal elevation  Discussed with patient need for RCT if pulp exposure occurs or in future if pulp is inflamed  Pt understands and consents  Applied topical benzocaine, administered 1 carps 2% lidocaine 1:100k epi via ZAFAR and 2 carpules 4% articaine 1:100k epi via buccal infiltration near #21 and #8  Provisional crown on #8 removed  Prepped tooth #21 MODB with 245 carbide on high speed  Caries removed with round carbide on slow speed  Dr Hamlet Lam completed restoration on #21  Placed wooden wedge for heme control  [place]ff[place]ire matrix band cut and used to build up distal with GI resin  Isolation with cotton rolls  Etch with 37% H2PO4, rinse, dry  Applied Adhese with 20 second scrub once, gentle air dry and light cured for 10s  Restored with Tetric bulk johnna shade A1 and light cured  Refined with finishing burs, polished with enhance point  Verified occlusion and contacts  Dr Hamlet Lam evaluated and completed new build up on #8  Putty matrix with shade A1 composite used  Created a core build up crown (entire provisional is core build up; not cemented w/ Vane Granados)  Removed tooth from occlusion and adjusted #25 B which sits buccal and hits #8  Explained to pt there is a diastema as closing the space would put the tooth back in occlusion  Explained to pt we would like her to see how this provisional does with occlusion  Pt understood  PA #8 and BW #21 taken  Excess from M and D of #8 removed  Pt left ambulatory and satisfied      NV: re-prep #8, new provisional, final impression #8 - 2 hours on Dr aHmlet Lam day only

## 2023-05-08 ENCOUNTER — OFFICE VISIT (OUTPATIENT)
Dept: DENTISTRY | Facility: CLINIC | Age: 58
End: 2023-05-08

## 2023-05-08 VITALS — TEMPERATURE: 97.1 F | DIASTOLIC BLOOD PRESSURE: 79 MMHG | HEART RATE: 71 BPM | SYSTOLIC BLOOD PRESSURE: 128 MMHG

## 2023-05-08 DIAGNOSIS — Z78.9 FULL COVERAGE CROWN NEEDED FOR ROOT CANAL-TREATED TOOTH: Primary | ICD-10-CM

## 2023-05-08 NOTE — PROGRESS NOTES
[place] #8 Final Impression    Latesha Drape presents for crown #8 final impression  PMH reviewed, no changes  Applied topical benzocaine, administered 1 carpule 2% lidocaine 1:100k epi via buccal and palatal infiltration  Provisional crown removed and excess cement removed from prep  Packed size 00 and 0 cord soaked in hemodent  Removed size 0 cord, air dry  Impression made with Delkit light and heavy body using full arch tray  Impression removed after 5 min, confirmed preparation captured with no voids or distortions  Counter impression taken with Silgamix  Bite registration taken with Blue bite  Cemented provisional crown using Provicell  Removed excess cement, occlusion and contacts verified  Selected shade A1  Pt left satisfied and ambulatory      NV: delivery of crown #8

## 2023-06-12 ENCOUNTER — OFFICE VISIT (OUTPATIENT)
Dept: DENTISTRY | Facility: CLINIC | Age: 58
End: 2023-06-12

## 2023-06-12 VITALS — HEART RATE: 68 BPM | SYSTOLIC BLOOD PRESSURE: 117 MMHG | DIASTOLIC BLOOD PRESSURE: 74 MMHG

## 2023-06-12 DIAGNOSIS — Z78.9 FULL COVERAGE CROWN NEEDED FOR ROOT CANAL-TREATED TOOTH: Primary | ICD-10-CM

## 2023-06-12 PROCEDURE — D2740 CROWN - PORCELAIN/CERAMIC: HCPCS

## 2023-06-13 NOTE — PROGRESS NOTES
[place] Delivery #8    [place] presents for delivery of zirconia crown #8  PMH reviewed, no changes  Pt reports no pain or sensitivity from tooth since last visit  ASA 2, pain 0  Applied topical benzocaine, administered 0 5 carpule 4% articaine 1:100k epi via buccal and palatal infiltration  Provisional crown removed, cement removed from prep with   Cleaned prep  Crown tried on die, confirmed accurate seating, margin will adapted and sealed  [place] tried intraorally, crown contacts were adequate  [place] seating fully with floss snapping through contacts  Verified seating with radiograph  Occlusion verified and adjusted with finishing burs  Pt confirmed satisfaction with shade via pt mirror  Placed retraction paste to help control bleeding  Rinse of prep and air dry, isolated with cotton rolls  Etch with 37% H2PO4, rinse, dry  Applied Adhese with 20 second scrub once, gentle air dry and light cured for 10s  Cemented with Calibra, tack cured, excess cement removed with high speed suction  After 3 min, floss through contacts to remove interproximal cement  Remaining cement removal after 5 minute final set  Occlusion and contacts verified  Pt comfortable with bite and esthetics  Pt left satisfied and ambulatory      NV:  Recall + take PA of #21 (pt states some sensitivity since resin has been completed)

## 2023-08-04 ENCOUNTER — OFFICE VISIT (OUTPATIENT)
Dept: DENTISTRY | Facility: CLINIC | Age: 58
End: 2023-08-04

## 2023-08-04 VITALS — SYSTOLIC BLOOD PRESSURE: 118 MMHG | DIASTOLIC BLOOD PRESSURE: 85 MMHG | HEART RATE: 67 BPM

## 2023-08-04 DIAGNOSIS — Z01.21 ENCOUNTER FOR DENTAL EXAMINATION AND CLEANING WITH ABNORMAL FINDINGS: Primary | ICD-10-CM

## 2023-08-04 DIAGNOSIS — K13.70 ORAL LESION: ICD-10-CM

## 2023-08-04 PROCEDURE — D0120 PERIODIC ORAL EVALUATION - ESTABLISHED PATIENT: HCPCS

## 2023-08-04 PROCEDURE — D1110 PROPHYLAXIS - ADULT: HCPCS

## 2023-08-04 RX ORDER — CETIRIZINE HYDROCHLORIDE 10 MG/1
10 TABLET ORAL DAILY
COMMUNITY
Start: 2023-07-25 | End: 2023-08-09

## 2023-08-04 RX ORDER — TRAZODONE HYDROCHLORIDE 50 MG/1
50 TABLET ORAL EVERY EVENING
COMMUNITY
Start: 2023-05-31

## 2023-08-04 RX ORDER — TRAZODONE HYDROCHLORIDE 50 MG/1
1 TABLET ORAL EVERY EVENING
COMMUNITY
Start: 2023-05-01

## 2023-08-04 RX ORDER — FLUTICASONE PROPIONATE 50 MCG
2 SPRAY, SUSPENSION (ML) NASAL DAILY
COMMUNITY
Start: 2023-04-06

## 2023-08-04 RX ORDER — METHYLPREDNISOLONE 4 MG/1
TABLET ORAL
COMMUNITY
Start: 2023-06-02

## 2023-08-04 RX ORDER — PREDNISONE 10 MG/1
TABLET ORAL
COMMUNITY
Start: 2023-06-30

## 2023-08-04 RX ORDER — ERGOCALCIFEROL 1.25 MG/1
1 CAPSULE ORAL WEEKLY
COMMUNITY
Start: 2023-05-31

## 2023-08-04 RX ORDER — MELOXICAM 7.5 MG/1
7.5 TABLET ORAL DAILY
COMMUNITY
Start: 2023-06-16 | End: 2024-06-15

## 2023-08-04 NOTE — DENTAL PROCEDURE DETAILS
Prophylaxis completed with ultrasonic  and hand instrumentation. Soft plaque removed and supragingival calculus removed from all quads. Polished with prophy cup and paste. Flossed and provided Oral Health Instructions. Demonstrated proper brushing and flossing technique. Patient left satisfied and ambulatory. PERIODIC EXAM, ADULT PROPHY      REVIEWED MED HX: meds, allergies, health changes reviewed in EPIC  CHIEF CONCERN: " A week after I had a crown placed on #8, the tooth next door broke."  Patient was pointing to #9 L. There was an existing resin on #9 DL. PAIN SCALE:  0  ASA CLASS:  II  PLAQUE:  moderate  CALCULUS:  light   BLEEDING:   none  STAIN :   none  ORAL HYGIENE:  good   PERIO: Stable    Hand scaled, polished and flossed. Oral Hygiene Instruction:  recommended brushing 2 x daily for 2 minutes MIN, recommended flossing daily, reviewed dietary precautions. Discussed Prev. 5000 + use and sent script to pharmacy    Visual and Tactile Intraoral/ Extraoral evaluation: Oral and Oropharyngeal cancer evaluation. No findings     Dr. Lizzie Ganser exam=   Reviewed with patient clinical and radiographic findings and patient verbalized understanding. All questions and concerns addressed. REFERRALS: OMS referral provided  -Dark mucosa lesion next to #2-3 area measuring 3 x 6 mm. Referral provided.     CARIES FINDINGS:   #9 -ML has restoration out and needs to be replaced       TREATMENT  PLAN :   1) #9-ML resin  2) #11-Crown Prep / Endo tooth    Next Recall: 6 month recall with periodic exam and BWX / PAs    Last BWX: 9-  Last Panorex/ :  3-

## 2023-08-11 ENCOUNTER — OFFICE VISIT (OUTPATIENT)
Dept: DENTISTRY | Facility: CLINIC | Age: 58
End: 2023-08-11

## 2023-08-11 VITALS — DIASTOLIC BLOOD PRESSURE: 64 MMHG | SYSTOLIC BLOOD PRESSURE: 95 MMHG | HEART RATE: 85 BPM

## 2023-08-11 DIAGNOSIS — K02.9 DENTAL CARIES: Primary | ICD-10-CM

## 2023-08-11 PROBLEM — F43.81 COMPLEX GRIEF DISORDER LASTING LONGER THAN 12 MONTHS: Status: ACTIVE | Noted: 2022-02-23

## 2023-08-11 PROBLEM — F41.9 ANXIETY: Status: ACTIVE | Noted: 2023-03-01

## 2023-08-11 PROCEDURE — D2331 RESIN-BASED COMPOSITE - 2 SURFACES, ANTERIOR: HCPCS | Performed by: DENTIST

## 2023-08-11 NOTE — DENTAL PROCEDURE DETAILS
Patient scheduled for hygiene recall Feb 2024  [facility], [facility], ASA 3 - Patient with moderate systemic disease with functional limitations. Patient reports pain level of 0. Patient presents for restorative treatment #9-DL. EOE WNL. IOE shows no swelling or sinus tracts. Anesthesia: 0.5 carpules Articaine, 4% with Epinephrine 1:100,000, given via buccal Infiltration. Isolation: Cotton roll isolation achieved  Tx:  Pre-wedged contact and Primary caries removed. Mylar strip placed and Wedge placed. Selective etched for 12 seconds with 37% phosphoric acid and rinsed, Hema-Glu desensitizer applied with microbrush for 30 seconds then rinsed and lightly air dried, Ivoclar Adhese Universal bond placed with VivaPen 20 second scrub, air dried for 5 seconds and light cured, and restored with Tetric Evoflow and Evoceram composite shade A1. Occlusion checked with articulation paper, Margins checked with explorer, and Contacts checked with floss. Adjusted as needed. Finished and polished. Patient satisfied and dismissed alert and ambulatory. Behavior ++, very good for injection.     NV: [place] prep #11 (per existing Tx plan and notes)

## 2024-04-12 ENCOUNTER — TELEPHONE (OUTPATIENT)
Age: 59
End: 2024-04-12

## 2024-04-12 ENCOUNTER — TELEPHONE (OUTPATIENT)
Dept: NEUROSURGERY | Facility: CLINIC | Age: 59
End: 2024-04-12

## 2024-04-12 NOTE — TELEPHONE ENCOUNTER
Pt called after getting  referred to Dr Garcia by [facility] for SCS trial, spoke with pt and trial is needed by another provider. Advised she can select any spine and pain center to start a trail and gave her the name and number to  [facility]. Advised we would be happy to see her post trial for an appt w/ Dr Garcia. NM will reach out to Dr Dumont and advise the referral process.  Pt had no additional questions but advised she could reach out to us at any time.

## 2024-04-12 NOTE — TELEPHONE ENCOUNTER
Caller: jose Fried    Doctor:     Reason for call: pt had prior pain mgmt. Pt would like a medical release form mailed out to her, for us to obtain her records.     Call back#: [phone]

## 2024-05-23 ENCOUNTER — CONSULT (OUTPATIENT)
Dept: PAIN MEDICINE | Facility: CLINIC | Age: 59
End: 2024-05-23
Payer: MEDICARE

## 2024-05-23 VITALS
HEART RATE: 89 BPM | SYSTOLIC BLOOD PRESSURE: 90 MMHG | BODY MASS INDEX: 33.95 KG/M2 | HEIGHT: 68 IN | WEIGHT: 224 LBS | DIASTOLIC BLOOD PRESSURE: 71 MMHG

## 2024-05-23 DIAGNOSIS — G57.53 TARSAL TUNNEL SYNDROME OF BOTH LOWER EXTREMITIES: Primary | ICD-10-CM

## 2024-05-23 DIAGNOSIS — G89.4 CHRONIC PAIN DISORDER: ICD-10-CM

## 2024-05-23 DIAGNOSIS — G90.521 COMPLEX REGIONAL PAIN SYNDROME TYPE 1 OF RIGHT LOWER EXTREMITY: ICD-10-CM

## 2024-05-23 PROCEDURE — 99244 OFF/OP CNSLTJ NEW/EST MOD 40: CPT | Performed by: ANESTHESIOLOGY

## 2024-05-23 NOTE — PROGRESS NOTES
Assessment  1. Tarsal tunnel syndrome of both lower extremities    2. Chronic pain disorder    3. Complex regional pain syndrome type 1 of right lower extremity        Plan  58-year-old female referred by Dr. Garcia of neurosurgery, with a history of tarsal tunnel surgery status post right tarsal tunnel release x 2 (1992 and 1993), RA, and chronic pain syndrome presenting for consultation regarding chronic bilateral foot and ankle pain worse on the right than left.  The patient does endorse swelling, color changes, and allodynia particularly of the right foot.  The patient's symptoms in the right foot were alleviated for about a week after surgery and then quickly returned back to baseline.  She never pursued surgery on the left side since the right side was ineffective.  She denies any back pain.  X-ray of the feet demonstrate mild OA at the first MTP joint bilaterally.  Small calcaneal spur noted in the left foot.  MRI of the right ankle and heel demonstrate mild subcutaneous edema medially along the distal right lower leg.  No tendon or ligamentous injury.  No fractures or dislocation.  The patient has been trialed on numerous medications including opioids, NSAIDs, oral steroids, topical agents such as lidocaine and Voltaren gel, gabapentin, Lyrica, Cymbalta, and numerous muscle relaxants all without any significant relief.  Physical therapy was of no benefit.  The patient did have a right lumbar sympathetic block with Dr. Dumont without any improvement in her symptoms and does continue to have some residual back pain from this injection.  This injection was done a few months ago per the patient.  The patient symptoms are most consistent with peripheral neuropathy/tarsal tunnel syndrome.  Right lower extremity symptoms consistent with CRPS of the right lower extremity.      I will order EMG of bilateral lower extremities to ensure there is no other pathology contributing to her symptoms.  It was recommended  by her previous pain specialist to discuss spinal cord stimulator trial with Dr. Garcia of neurosurgery who has kindly referred the patient for further evaluation and possible spinal cord stimulator trial.  I did discuss spinal cord stimulation in detail using diagrams and models as well as the authorization process.  She was provided literature regarding spinal cord stimulation from [employer] and [employer] to review.    The patient was instructed to contact our office once appointment for EMG of her legs has been made and we will follow-up with her thereafter to discuss results as well as to discuss whether or not she would like to pursue spinal cord stimulation for neuropathic pain control.        My impressions and treatment recommendations were discussed in detail with the patient who verbalized understanding and had no further questions.  Discharge instructions were provided. I personally saw and examined the patient and I agree with the above discussed plan of care.    No orders of the defined types were placed in this encounter.    No orders of the defined types were placed in this encounter.      History of Present Illness    Fariha Dale is a 58 y.o. female referred by Dr. Garcia of neurosurgery, with a history of tarsal tunnel surgery status post right tarsal tunnel release x 2 (1992 and 1993), RA, and chronic pain syndrome presenting for consultation regarding chronic bilateral foot and ankle pain worse on the right than left.  The patient does endorse swelling, color changes, and allodynia particularly of the right foot.  The patient's symptoms in the right foot were alleviated for about a week after surgery and then quickly returned back to baseline.  She never pursued surgery on the left side since the right side was ineffective.  She denies any back pain.  She denies any significant weakness of her legs.  X-ray of the feet demonstrate mild OA at the first MTP joint bilaterally.  Small calcaneal spur  noted in the left foot.  MRI of the right ankle and heel demonstrate mild subcutaneous edema medially along the distal right lower leg.  No tendon or ligamentous injury.  No fractures or dislocation.  The patient has been trialed on numerous medications including opioids, NSAIDs, oral steroids, topical agents such as lidocaine and Voltaren gel, gabapentin, Lyrica, Cymbalta, and numerous muscle relaxants all without any significant relief.  Physical therapy was of no benefit.  The patient did have a right lumbar sympathetic block with Dr. Dumont without any improvement in her symptoms and does continue to have some residual back pain from this injection.  This injection was done a few months ago per the patient.  The patient rates her pain an 8 out of 10 and the pain is constant.  The pain is worse at night.  The pain is described as burning, shooting, numbness, sharp, pins-and-needles, and throbbing.  The pain is increased with standing, walking, and exercise.  The pain is alleviated with acupuncture, biofeedback, and a TENS unit transiently.    Other than as stated above, the patient denies any interval changes in medications, medical condition, mental condition, symptoms, or allergies since the last office visit.    I have personally reviewed and/or updated the patient's past medical history, past surgical history, family history, social history, current medications, allergies, and vital signs today.     Review of Systems   Constitutional:  Negative for fever and unexpected weight change.   HENT:  Negative for trouble swallowing.    Eyes:  Negative for visual disturbance.   Respiratory:  Negative for shortness of breath and wheezing.    Cardiovascular:  Negative for chest pain and palpitations.   Gastrointestinal:  Positive for abdominal pain. Negative for constipation, diarrhea, nausea and vomiting.   Endocrine: Negative for cold intolerance, heat intolerance and polydipsia.   Genitourinary:  Negative for  difficulty urinating and frequency.   Musculoskeletal:  Positive for arthralgias, joint swelling and myalgias. Negative for gait problem.   Skin:  Negative for rash.   Neurological:  Positive for headaches. Negative for dizziness, seizures, syncope and weakness.   Hematological:  Does not bruise/bleed easily.   Psychiatric/Behavioral:  Negative for dysphoric mood.         Anxiety   All other systems reviewed and are negative.      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    Endometrial polyp    Facial skin lesion    Grief    Hemangioma of choroid    Herpes zoster    History of cholecystectomy    Hypertension, essential    Mammogram declined    Migraine with aura and without status migrainosus, not intractable    Mixed hyperlipidemia    Other constipation    Pre-diabetes    Rheumatoid arthritis involving multiple sites with positive rheumatoid factor (HCC)    Severe episode of recurrent major depressive disorder, without psychotic features (HCC)    Tarsal tunnel syndrome of both lower extremities    Vitamin D deficiency    Vulvar lesion    Psychophysiological insomnia    Class 2 obesity    Encounter for screening involving social determinants of health (SDoH)    Anxiety    Complex grief disorder lasting longer than 12 months       Past Medical History:   Diagnosis Date    Hypertension        No past surgical history on file.    No family history on file.    Social History     Occupational History    Not on file   Tobacco Use    Smoking status: Never    Smokeless tobacco: Never   Substance and Sexual Activity    Alcohol use: Never    Drug use: Never    Sexual activity: Not on file       Current Outpatient Medications on File Prior to Visit   Medication Sig    amLODIPine (NORVASC) 5  mg tablet Take 5 mg by mouth daily    amLODIPine (NORVASC) 5 mg tablet     aspirin (ECOTRIN LOW STRENGTH) 81 mg EC tablet Take by mouth    aspirin 81 mg chewable tablet Chew    buPROPion (FORFIVO XL) 450 MG 24 hr tablet     buPROPion (FORFIVO XL) 450 MG 24 hr tablet Take 450 mg by mouth daily    cetirizine (ZyrTEC) 10 mg tablet Take 10 mg by mouth daily    ergocalciferol (ERGOCALCIFEROL) 1.25 MG (91542 UT) capsule Take 1 capsule by mouth once a week    fluticasone (FLONASE) 50 mcg/act nasal spray 2 sprays into each nostril daily    hydrOXYzine HCL (ATARAX) 25 mg tablet 25 mg every 6 hours as needed for anxiety can increase to 50 mg if needed    hydrOXYzine HCL (ATARAX) 25 mg tablet     lidocaine (LIDODERM) 5 % PLACE 1 PATCH ON THE SKIN EVERY 12 HOURS. REMOVE AND DISCARD PATCH WITHIN 12 HOURS OR AS DIRECTED BY MD    lidocaine (LMX) 4 % cream     lisinopril (ZESTRIL) 20 mg tablet Take by mouth    lisinopril (ZESTRIL) 30 mg tablet Take 30 mg by mouth daily    meloxicam (MOBIC) 7.5 mg tablet Take 7.5 mg by mouth daily    methylPREDNISolone 4 MG tablet therapy pack Follow package directions    polyethylene glycol (GLYCOLAX) 17 GM/SCOOP powder     predniSONE 10 mg tablet 4 pills x 4 days, 3 pills daily x 3 days; then 2 pills daily x 2 days; then 1 pill daily x 1 days    propranolol (INDERAL) 60 mg tablet Take 60 mg by mouth 3 (three) times a day    sertraline (ZOLOFT) 50 mg tablet Take 1 tablet by mouth daily    traZODone (DESYREL) 50 mg tablet Take 1 tablet by mouth every evening    traZODone (DESYREL) 50 mg tablet Take 50 mg by mouth every evening     No current facility-administered medications on file prior to visit.       Allergies   Allergen Reactions    Metronidazole GI Intolerance     3 days of vomiting and diarrhea      Hydrochlorothiazide GI Intolerance and Myalgia     N/v diarrhea & muscle cramping    Bupropion GI Intolerance     At higher dose, metallic taste and nausea       Physical Exam    BP 90/71   Pulse  "89   Ht 5' 8\" (1.727 m)   Wt 102 kg (224 lb)   BMI 34.06 kg/m²     Constitutional: normal, well developed, well nourished, alert, in no distress and non-toxic and no overt pain behavior.  Eyes: anicteric  HEENT: grossly intact  Neck: supple, symmetric, trachea midline and no masses   Pulmonary:even and unlabored  Cardiovascular:No edema or pitting edema present  Skin:Normal without rashes or lesions and well hydrated  Psychiatric:Mood and affect appropriate  Neurologic:Cranial Nerves II-XII grossly intact  Musculoskeletal:antalgic gait.  Bilateral lumbar paraspinals and SI joints nontender to palpation.  Bilateral patellar and Achilles reflexes were 2 out of 4 and symmetrical.  No clonus is noted bilaterally.  Bilateral lower extremity strength 5 out of 5 in all muscle groups.  Sensation diminished to light touch in the plantar aspect of the right foot around the calcaneus.  Hyperesthesia noted along the old incision along the posterior medial aspect of the right foot/ankle.  No color or temperature changes noted of either foot or ankle.  Palpable pedal pulses bilaterally.  Negative straight leg raise bilaterally.  No edema noted of either foot or ankle.    Imaging    XR foot 2 vw right  Order: [id number]  Impression    Impression:  Findings of mild primary osteoarthritis are noted at the first  metatarsophalangeal joints bilaterally. No acute osseous abnormality.            Workstation:[email or web address]  Narrative    History: Polyarthralgia. Rheumatoid factor positive    Interpretation: The bilateral feet were examined with 2 views each on 1/15/2020.    Comparison: None.    Left foot: No acute fracture or dislocation. Alignment is normal. Joint space  narrowing and marginal spurring is noted at the first metatarsophalangeal joint.  The remaining articular surfaces are unremarkable. No significant erosive  changes. Small plantar calcaneal spur is noted.    Right foot: No acute fracture or dislocation. Alignment is normal. " Mild marginal  spurring is noted at the first carpometacarpal joint. Remaining articular  surfaces are unremarkable. No significant erosive changes.  Exam End: 01/15/20 10:13 AM    Specimen Collected: 01/15/20  4:43 PM Last Resulted: 01/15/20  4:45 PM   Received From: [facility]  Result Received: 06/12/23  1:50 PM       XR foot 2 vw left  Order: [id number]  Impression    Impression:  Findings of mild primary osteoarthritis are noted at the first  metatarsophalangeal joints bilaterally. No acute osseous abnormality.            Workstation:[email or web address]  Narrative    History: Polyarthralgia. Rheumatoid factor positive    Interpretation: The bilateral feet were examined with 2 views each on 1/15/2020.    Comparison: None.    Left foot: No acute fracture or dislocation. Alignment is normal. Joint space  narrowing and marginal spurring is noted at the first metatarsophalangeal joint.  The remaining articular surfaces are unremarkable. No significant erosive  changes. Small plantar calcaneal spur is noted.    Right foot: No acute fracture or dislocation. Alignment is normal. Mild marginal  spurring is noted at the first carpometacarpal joint. Remaining articular  surfaces are unremarkable. No significant erosive changes.  Exam End: 01/15/20 10:13 AM    Specimen Collected: 01/15/20  4:43 PM Last Resulted: 01/15/20  4:45 PM   Received From: [facility]  Result Received: 06/12/23  1:50 PM

## 2024-05-23 NOTE — LETTER
May 23, 2024     Kofi Garcia MD  [address]   [address]  [place] [address]    Patient: Fariha Dale   YOB: 1965   Date of Visit: 5/23/2024       Dear Dr. Garcia:    Thank you for referring Fariha Dale to me for evaluation. Below are my notes for this consultation.    If you have questions, please do not hesitate to call me. I look forward to following your patient along with you.         Sincerely,        Esteban Reynoso, DO        CC: No Recipients

## 2024-06-24 ENCOUNTER — OFFICE VISIT (OUTPATIENT)
Dept: PAIN MEDICINE | Facility: CLINIC | Age: 59
End: 2024-06-24
Payer: MEDICARE

## 2024-06-24 VITALS
SYSTOLIC BLOOD PRESSURE: 116 MMHG | HEIGHT: 68 IN | WEIGHT: 223 LBS | HEART RATE: 76 BPM | BODY MASS INDEX: 33.8 KG/M2 | DIASTOLIC BLOOD PRESSURE: 77 MMHG

## 2024-06-24 DIAGNOSIS — G57.53 TARSAL TUNNEL SYNDROME OF BOTH LOWER EXTREMITIES: ICD-10-CM

## 2024-06-24 DIAGNOSIS — M54.16 LUMBAR RADICULOPATHY: Primary | ICD-10-CM

## 2024-06-24 PROCEDURE — 99213 OFFICE O/P EST LOW 20 MIN: CPT | Performed by: NURSE PRACTITIONER

## 2024-06-24 NOTE — PROGRESS NOTES
Assessment:  1. Lumbar radiculopathy    2. Tarsal tunnel syndrome of both lower extremities        Plan:  EMG of the lower extremities revealed a chronic right L5-S1 radiculopathy.  I will order an MRI of the lumbar spine without contrast for further evaluation  Patient given referral to foot and ankle orthopedic specialist for evaluation.  May consider spinal cord stimulator trial in the future  Continue home exercise program  Follow-up after imaging or sooner if needed    History of Present Illness:    The patient is a 58 y.o. female with a history of tarsal tunnel surgery status post right tarsal tunnel release x 2, and RA last seen on 5/23/2024 who presents for a follow up office visit in regards to chronicbilateral foot and ankle pain, right greater than left.  She does endorse intermittent swelling, color changes and allodynia particularly of the right foot.  She has never pursued surgery on the left side since right side was ineffective.  She did have an EMG recently of the lower extremities which showed axonal peripheral polyneuropathy, right greater than left.  A patchy old right L5-S1 radiculopathy.  There is no definitive evidence of a tibial mononeuropathy at the ankle bilaterally.  The patient has not recently been seen by orthopedics.  I do not have any advanced imaging of the lumbar spine.  She has been trialed on numerous medications including opiates, NSAIDs, oral steroids, topical agents such as lidocaine and Voltaren gel, gabapentin, Lyrica, Cymbalta, and numerous muscle relaxants all without any significant benefit.  She has not found any relief with physical therapy or a right lumbar sympathetic nerve block.  She did review literature regarding a spinal cord stimulator trial and continues to contemplate this option.    The patient rates her pain an 8 out of 10 on the numeric pain rating scale.  Pain is constant and is described as burning, sharp, throbbing and shooting    I have personally  reviewed and/or updated the patient's past medical history, past surgical history, family history, social history, current medications, allergies, and vital signs today.       Review of Systems:    Review of Systems   Respiratory:  Negative for shortness of breath.    Cardiovascular:  Negative for chest pain.   Gastrointestinal:  Negative for constipation, diarrhea, nausea and vomiting.   Musculoskeletal:  Positive for gait problem. Negative for arthralgias, joint swelling and myalgias.   Skin:  Negative for rash.   Neurological:  Negative for dizziness, seizures and weakness.   All other systems reviewed and are negative.        Past Medical History:   Diagnosis Date    Hypertension        History reviewed. No pertinent surgical history.    History reviewed. No pertinent family history.    Social History     Occupational History    Not on file   Tobacco Use    Smoking status: Never    Smokeless tobacco: Never   Substance and Sexual Activity    Alcohol use: Never    Drug use: Never    Sexual activity: Not on file         Current Outpatient Medications:     amLODIPine (NORVASC) 5 mg tablet, Take 5 mg by mouth daily, Disp: , Rfl:     amLODIPine (NORVASC) 5 mg tablet, , Disp: , Rfl:     aspirin (ECOTRIN LOW STRENGTH) 81 mg EC tablet, Take by mouth, Disp: , Rfl:     buPROPion (FORFIVO XL) 450 MG 24 hr tablet, , Disp: , Rfl:     buPROPion (FORFIVO XL) 450 MG 24 hr tablet, Take 450 mg by mouth daily, Disp: , Rfl:     fluticasone (FLONASE) 50 mcg/act nasal spray, 2 sprays into each nostril daily, Disp: , Rfl:     hydrOXYzine HCL (ATARAX) 25 mg tablet, 25 mg every 6 hours as needed for anxiety can increase to 50 mg if needed, Disp: , Rfl:     lisinopril (ZESTRIL) 30 mg tablet, Take 30 mg by mouth daily, Disp: , Rfl:     polyethylene glycol (GLYCOLAX) 17 GM/SCOOP powder, , Disp: , Rfl:     traZODone (DESYREL) 50 mg tablet, Take 1 tablet by mouth every evening, Disp: , Rfl:     aspirin 81 mg chewable tablet, Chew (Patient not  "taking: Reported on 5/23/2024), Disp: , Rfl:     cetirizine (ZyrTEC) 10 mg tablet, Take 10 mg by mouth daily, Disp: , Rfl:     ergocalciferol (ERGOCALCIFEROL) 1.25 MG (41102 UT) capsule, Take 1 capsule by mouth once a week (Patient not taking: Reported on 5/23/2024), Disp: , Rfl:     hydrOXYzine HCL (ATARAX) 25 mg tablet, , Disp: , Rfl:     lidocaine (LIDODERM) 5 %, PLACE 1 PATCH ON THE SKIN EVERY 12 HOURS. REMOVE AND DISCARD PATCH WITHIN 12 HOURS OR AS DIRECTED BY MD, Disp: , Rfl:     lidocaine (LMX) 4 % cream, , Disp: , Rfl:     lisinopril (ZESTRIL) 20 mg tablet, Take by mouth (Patient not taking: Reported on 5/23/2024), Disp: , Rfl:     meloxicam (MOBIC) 7.5 mg tablet, Take 7.5 mg by mouth daily, Disp: , Rfl:     methylPREDNISolone 4 MG tablet therapy pack, Follow package directions (Patient not taking: Reported on 5/23/2024), Disp: , Rfl:     predniSONE 10 mg tablet, 4 pills x 4 days, 3 pills daily x 3 days; then 2 pills daily x 2 days; then 1 pill daily x 1 days, Disp: , Rfl:     propranolol (INDERAL) 60 mg tablet, Take 60 mg by mouth 3 (three) times a day, Disp: , Rfl:     sertraline (ZOLOFT) 50 mg tablet, Take 1 tablet by mouth daily (Patient not taking: Reported on 5/23/2024), Disp: , Rfl:     traZODone (DESYREL) 50 mg tablet, Take 50 mg by mouth every evening (Patient not taking: Reported on 6/24/2024), Disp: , Rfl:     Allergies   Allergen Reactions    Metronidazole GI Intolerance     3 days of vomiting and diarrhea      Hydrochlorothiazide GI Intolerance and Myalgia     N/v diarrhea & muscle cramping    Bupropion GI Intolerance     At higher dose, metallic taste and nausea       Physical Exam:    /77   Pulse 76   Ht 5' 8\" (1.727 m)   Wt 101 kg (223 lb)   BMI 33.91 kg/m²     Constitutional:normal, well developed, well nourished, alert, in no distress and non-toxic and no overt pain behavior.  Eyes:anicteric  HEENT:grossly intact  Neck:supple, symmetric, trachea midline and no masses "   Pulmonary:even and unlabored  Cardiovascular:No edema or pitting edema present  Skin:Normal without rashes or lesions and well hydrated  Psychiatric:Mood and affect appropriate  Neurologic:Cranial Nerves II-XII grossly intact  Musculoskeletal:antalgic and ambulates with cane      Imaging  MRI lumbar spine wo contrast    (Results Pending)         Orders Placed This Encounter   Procedures    MRI lumbar spine wo contrast    Ambulatory referral to Orthopedic Surgery

## 2024-06-26 ENCOUNTER — TELEPHONE (OUTPATIENT)
Dept: OBGYN CLINIC | Facility: CLINIC | Age: 59
End: 2024-06-26

## 2024-06-26 NOTE — TELEPHONE ENCOUNTER
Reviewed referral with dr lachman,  he recommends Podiatrist Dr Mcdaniels. Pleaser schedule with dr mcdaniels when pt calls back to schedule . I did leave a detailed message.

## 2024-07-11 ENCOUNTER — HOSPITAL ENCOUNTER (OUTPATIENT)
Dept: RADIOLOGY | Age: 59
Discharge: HOME | End: 2024-07-11

## 2024-07-11 DIAGNOSIS — M54.16 LUMBAR RADICULOPATHY: ICD-10-CM

## 2024-08-20 ENCOUNTER — OFFICE VISIT (OUTPATIENT)
Dept: PODIATRY | Facility: CLINIC | Age: 59
End: 2024-08-20
Payer: MEDICARE

## 2024-08-20 VITALS
WEIGHT: 224 LBS | HEIGHT: 68 IN | BODY MASS INDEX: 33.95 KG/M2 | DIASTOLIC BLOOD PRESSURE: 84 MMHG | SYSTOLIC BLOOD PRESSURE: 118 MMHG | HEART RATE: 91 BPM

## 2024-08-20 DIAGNOSIS — M79.672 PAIN IN BOTH FEET: ICD-10-CM

## 2024-08-20 DIAGNOSIS — G57.53 TARSAL TUNNEL SYNDROME OF BOTH LOWER EXTREMITIES: Primary | ICD-10-CM

## 2024-08-20 DIAGNOSIS — M79.671 PAIN IN BOTH FEET: ICD-10-CM

## 2024-08-20 PROCEDURE — 99243 OFF/OP CNSLTJ NEW/EST LOW 30: CPT | Performed by: PODIATRIST

## 2024-08-20 NOTE — LETTER
August 21, 2024     MOOKIE Cobb  [address]  [address]  [place] [address]    Patient: Fariha Dale   YOB: 1965   Date of Visit: 8/20/2024       Dear Dr. Horowitz:    Thank you for referring Fariha Dale to me for evaluation. Below are my notes for this consultation.    If you have questions, please do not hesitate to call me. I look forward to following your patient along with you.         Sincerely,        Steven Wood DPM        CC: No Recipients    Steven Wood DPM  8/21/2024  1:39 PM  Sign when Signing Visit                 PATIENT:  Fariha Dale  1965       ASSESSMENT:     1. Tarsal tunnel syndrome of both lower extremities  Ambulatory referral to Orthopedic Surgery      2. Pain in both feet                  PLAN:  1. Reviewed medical records.  Reviewed the notes from pain management.  Reviewed the recent labs and images.  Patient was counseled and educated on the condition and the diagnosis.    2. The exam and symptoms are consistent with chronic tarsal tunnel syndrome.  The diagnosis, treatment options and prognosis were discussed with the patient.    3.  Discussed options including repeat surgery.  Less likely repeat surgery would benefit her.  Will continue pain management.    4. Pt to try spinal stimulator.    5. Instructed supportive care, home exercise, and proper footwear/ arch support.     6. She may return as needed.      Imaging: I have personally reviewed pertinent films in PACS  Labs, pathology, and Other Studies: I have personally reviewed pertinent reports.        Subjective:       HPI  The patient was referred to my office for chronic tarsal tunnel syndrome.  She has this condition for many years.  She had two tarsal tunnel release in the past.  The last surgery was about 25 years ago.  She continues to have significant pain and sensitivity in plantar feet, right worse than left.  She also has numbness in her feet.  She was also treated with nerve block, medications, and PTs  without improvement.  She has been seeing pain management and is considering spinal stimulator.   No history of diabetes.  She has RA.  No acute pedal problem or injury.    The following portions of the patient's history were reviewed and updated as appropriate: allergies, current medications, past family history, past medical history, past social history, past surgical history and problem list.  All pertinent labs and images were reviewed.      Past Medical History  Past Medical History:   Diagnosis Date   • Hypertension        Past Surgical History  History reviewed. No pertinent surgical history.     Allergies:  Metronidazole, Hydrochlorothiazide, and Bupropion    Medications:  Current Outpatient Medications   Medication Sig Dispense Refill   • amLODIPine (NORVASC) 5 mg tablet Take 5 mg by mouth daily     • amLODIPine (NORVASC) 5 mg tablet      • aspirin (ECOTRIN LOW STRENGTH) 81 mg EC tablet Take by mouth     • buPROPion (FORFIVO XL) 450 MG 24 hr tablet      • buPROPion (FORFIVO XL) 450 MG 24 hr tablet Take 450 mg by mouth daily     • fluticasone (FLONASE) 50 mcg/act nasal spray 2 sprays into each nostril daily     • hydrOXYzine HCL (ATARAX) 25 mg tablet 25 mg every 6 hours as needed for anxiety can increase to 50 mg if needed     • lidocaine (LIDODERM) 5 % PLACE 1 PATCH ON THE SKIN EVERY 12 HOURS. REMOVE AND DISCARD PATCH WITHIN 12 HOURS OR AS DIRECTED BY MD     • lidocaine (LMX) 4 % cream      • lisinopril (ZESTRIL) 30 mg tablet Take 30 mg by mouth daily     • polyethylene glycol (GLYCOLAX) 17 GM/SCOOP powder      • predniSONE 10 mg tablet 4 pills x 4 days, 3 pills daily x 3 days; then 2 pills daily x 2 days; then 1 pill daily x 1 days     • propranolol (INDERAL) 60 mg tablet Take 60 mg by mouth 3 (three) times a day     • traZODone (DESYREL) 50 mg tablet Take 1 tablet by mouth every evening     • aspirin 81 mg chewable tablet Chew (Patient not taking: Reported on 5/23/2024)     • cetirizine (ZyrTEC) 10 mg  tablet Take 10 mg by mouth daily     • ergocalciferol (ERGOCALCIFEROL) 1.25 MG (57306 UT) capsule Take 1 capsule by mouth once a week (Patient not taking: Reported on 8/20/2024)     • hydrOXYzine HCL (ATARAX) 25 mg tablet  (Patient not taking: Reported on 6/24/2024)     • lisinopril (ZESTRIL) 20 mg tablet Take by mouth (Patient not taking: Reported on 5/23/2024)     • meloxicam (MOBIC) 7.5 mg tablet Take 7.5 mg by mouth daily     • methylPREDNISolone 4 MG tablet therapy pack Follow package directions (Patient not taking: Reported on 5/23/2024)     • sertraline (ZOLOFT) 50 mg tablet Take 1 tablet by mouth daily (Patient not taking: Reported on 5/23/2024)     • traZODone (DESYREL) 50 mg tablet Take 50 mg by mouth every evening (Patient not taking: Reported on 6/24/2024)       No current facility-administered medications for this visit.       Social History:  Social History     Socioeconomic History   • Marital status: Single     Spouse name: None   • Number of children: None   • Years of education: None   • Highest education level: None   Occupational History   • None   Tobacco Use   • Smoking status: Never   • Smokeless tobacco: Never   Substance and Sexual Activity   • Alcohol use: Never   • Drug use: Never   • Sexual activity: None   Other Topics Concern   • None   Social History Narrative   • None     Social Determinants of Health     Financial Resource Strain: Not on file   Food Insecurity: Not on file   Transportation Needs: Not on file   Physical Activity: Not on file   Stress: Not on file   Social Connections: Not on file   Intimate Partner Violence: Not on file   Housing Stability: Not on file          Review of Systems   Constitutional:  Negative for chills and fever.   HENT:  Negative for sore throat.    Respiratory:  Negative for cough and shortness of breath.    Cardiovascular:  Negative for chest pain.   Gastrointestinal:  Negative for nausea and vomiting.   Musculoskeletal:  Positive for arthralgias.  "  Skin:  Negative for wound.   Neurological:  Positive for numbness. Negative for weakness.   Hematological: Negative.    Psychiatric/Behavioral:  Negative for behavioral problems and confusion.          Objective:      /84   Pulse 91   Ht 5' 8\" (1.727 m)   Wt 102 kg (224 lb)   BMI 34.06 kg/m²          Physical Exam  Vitals reviewed.   Constitutional:       General: She is not in acute distress.     Appearance: She is not toxic-appearing or diaphoretic.   HENT:      Head: Normocephalic and atraumatic.   Eyes:      Extraocular Movements: Extraocular movements intact.   Cardiovascular:      Rate and Rhythm: Normal rate and regular rhythm.      Pulses: Normal pulses.           Dorsalis pedis pulses are 2+ on the right side and 2+ on the left side.        Posterior tibial pulses are 2+ on the right side and 2+ on the left side.   Pulmonary:      Effort: Pulmonary effort is normal. No respiratory distress.   Musculoskeletal:         General: No signs of injury.      Cervical back: Normal range of motion and neck supple.      Right lower leg: No edema.      Left lower leg: No edema.      Right foot: No foot drop.      Left foot: No foot drop.      Comments: Diffuse swelling in ankles.  Tinel sign and tenderness noted at tarsal tunnel, right worse than left.  Cutaneous sensation still intact.     Skin:     General: Skin is warm.      Capillary Refill: Capillary refill takes less than 2 seconds.      Coloration: Skin is not cyanotic or mottled.      Findings: No abscess or erythema.      Nails: There is no clubbing.   Neurological:      General: No focal deficit present.      Mental Status: She is alert and oriented to person, place, and time.      Cranial Nerves: No cranial nerve deficit.      Sensory: No sensory deficit.      Motor: No weakness.      Coordination: Coordination normal.   Psychiatric:         Mood and Affect: Mood normal.         Behavior: Behavior normal.         Thought Content: Thought content " normal.         Judgment: Judgment normal.

## 2024-08-20 NOTE — PROGRESS NOTES
PATIENT:  Fariha Dale  1965       ASSESSMENT:     1. Tarsal tunnel syndrome of both lower extremities  Ambulatory referral to Orthopedic Surgery      2. Pain in both feet                  PLAN:  1. Reviewed medical records.  Reviewed the notes from pain management.  Reviewed the recent labs and images.  Patient was counseled and educated on the condition and the diagnosis.    2. The exam and symptoms are consistent with chronic tarsal tunnel syndrome.  The diagnosis, treatment options and prognosis were discussed with the patient.    3.  Discussed options including repeat surgery.  Less likely repeat surgery would benefit her.  Will continue pain management.    4. Pt to try spinal stimulator.    5. Instructed supportive care, home exercise, and proper footwear/ arch support.     6. She may return as needed.      Imaging: I have personally reviewed pertinent films in PACS  Labs, pathology, and Other Studies: I have personally reviewed pertinent reports.        Subjective:       HPI  The patient was referred to my office for chronic tarsal tunnel syndrome.  She has this condition for many years.  She had two tarsal tunnel release in the past.  The last surgery was about 25 years ago.  She continues to have significant pain and sensitivity in plantar feet, right worse than left.  She also has numbness in her feet.  She was also treated with nerve block, medications, and PTs without improvement.  She has been seeing pain management and is considering spinal stimulator.   No history of diabetes.  She has RA.  No acute pedal problem or injury.    The following portions of the patient's history were reviewed and updated as appropriate: allergies, current medications, past family history, past medical history, past social history, past surgical history and problem list.  All pertinent labs and images were reviewed.      Past Medical History  Past Medical History:   Diagnosis Date    Hypertension         Past Surgical History  History reviewed. No pertinent surgical history.     Allergies:  Metronidazole, Hydrochlorothiazide, and Bupropion    Medications:  Current Outpatient Medications   Medication Sig Dispense Refill    amLODIPine (NORVASC) 5 mg tablet Take 5 mg by mouth daily      amLODIPine (NORVASC) 5 mg tablet       aspirin (ECOTRIN LOW STRENGTH) 81 mg EC tablet Take by mouth      buPROPion (FORFIVO XL) 450 MG 24 hr tablet       buPROPion (FORFIVO XL) 450 MG 24 hr tablet Take 450 mg by mouth daily      fluticasone (FLONASE) 50 mcg/act nasal spray 2 sprays into each nostril daily      hydrOXYzine HCL (ATARAX) 25 mg tablet 25 mg every 6 hours as needed for anxiety can increase to 50 mg if needed      lidocaine (LIDODERM) 5 % PLACE 1 PATCH ON THE SKIN EVERY 12 HOURS. REMOVE AND DISCARD PATCH WITHIN 12 HOURS OR AS DIRECTED BY MD      lidocaine (LMX) 4 % cream       lisinopril (ZESTRIL) 30 mg tablet Take 30 mg by mouth daily      polyethylene glycol (GLYCOLAX) 17 GM/SCOOP powder       predniSONE 10 mg tablet 4 pills x 4 days, 3 pills daily x 3 days; then 2 pills daily x 2 days; then 1 pill daily x 1 days      propranolol (INDERAL) 60 mg tablet Take 60 mg by mouth 3 (three) times a day      traZODone (DESYREL) 50 mg tablet Take 1 tablet by mouth every evening      aspirin 81 mg chewable tablet Chew (Patient not taking: Reported on 5/23/2024)      cetirizine (ZyrTEC) 10 mg tablet Take 10 mg by mouth daily      ergocalciferol (ERGOCALCIFEROL) 1.25 MG (77963 UT) capsule Take 1 capsule by mouth once a week (Patient not taking: Reported on 8/20/2024)      hydrOXYzine HCL (ATARAX) 25 mg tablet  (Patient not taking: Reported on 6/24/2024)      lisinopril (ZESTRIL) 20 mg tablet Take by mouth (Patient not taking: Reported on 5/23/2024)      meloxicam (MOBIC) 7.5 mg tablet Take 7.5 mg by mouth daily      methylPREDNISolone 4 MG tablet therapy pack Follow package directions (Patient not taking: Reported on 5/23/2024)    "   sertraline (ZOLOFT) 50 mg tablet Take 1 tablet by mouth daily (Patient not taking: Reported on 5/23/2024)      traZODone (DESYREL) 50 mg tablet Take 50 mg by mouth every evening (Patient not taking: Reported on 6/24/2024)       No current facility-administered medications for this visit.       Social History:  Social History     Socioeconomic History    Marital status: Single     Spouse name: None    Number of children: None    Years of education: None    Highest education level: None   Occupational History    None   Tobacco Use    Smoking status: Never    Smokeless tobacco: Never   Substance and Sexual Activity    Alcohol use: Never    Drug use: Never    Sexual activity: None   Other Topics Concern    None   Social History Narrative    None     Social Determinants of Health     Financial Resource Strain: Not on file   Food Insecurity: Not on file   Transportation Needs: Not on file   Physical Activity: Not on file   Stress: Not on file   Social Connections: Not on file   Intimate Partner Violence: Not on file   Housing Stability: Not on file          Review of Systems   Constitutional:  Negative for chills and fever.   HENT:  Negative for sore throat.    Respiratory:  Negative for cough and shortness of breath.    Cardiovascular:  Negative for chest pain.   Gastrointestinal:  Negative for nausea and vomiting.   Musculoskeletal:  Positive for arthralgias.   Skin:  Negative for wound.   Neurological:  Positive for numbness. Negative for weakness.   Hematological: Negative.    Psychiatric/Behavioral:  Negative for behavioral problems and confusion.          Objective:      /84   Pulse 91   Ht 5' 8\" (1.727 m)   Wt 102 kg (224 lb)   BMI 34.06 kg/m²          Physical Exam  Vitals reviewed.   Constitutional:       General: She is not in acute distress.     Appearance: She is not toxic-appearing or diaphoretic.   HENT:      Head: Normocephalic and atraumatic.   Eyes:      Extraocular Movements: Extraocular " movements intact.   Cardiovascular:      Rate and Rhythm: Normal rate and regular rhythm.      Pulses: Normal pulses.           Dorsalis pedis pulses are 2+ on the right side and 2+ on the left side.        Posterior tibial pulses are 2+ on the right side and 2+ on the left side.   Pulmonary:      Effort: Pulmonary effort is normal. No respiratory distress.   Musculoskeletal:         General: No signs of injury.      Cervical back: Normal range of motion and neck supple.      Right lower leg: No edema.      Left lower leg: No edema.      Right foot: No foot drop.      Left foot: No foot drop.      Comments: Diffuse swelling in ankles.  Tinel sign and tenderness noted at tarsal tunnel, right worse than left.  Cutaneous sensation still intact.     Skin:     General: Skin is warm.      Capillary Refill: Capillary refill takes less than 2 seconds.      Coloration: Skin is not cyanotic or mottled.      Findings: No abscess or erythema.      Nails: There is no clubbing.   Neurological:      General: No focal deficit present.      Mental Status: She is alert and oriented to person, place, and time.      Cranial Nerves: No cranial nerve deficit.      Sensory: No sensory deficit.      Motor: No weakness.      Coordination: Coordination normal.   Psychiatric:         Mood and Affect: Mood normal.         Behavior: Behavior normal.         Thought Content: Thought content normal.         Judgment: Judgment normal.

## 2024-09-04 ENCOUNTER — TELEPHONE (OUTPATIENT)
Age: 59
End: 2024-09-04

## 2024-09-04 NOTE — TELEPHONE ENCOUNTER
S/w pt and pt had trouble with MRI, felt too claustrophobic.  Nurse advised pt to call insurance and see what locations may offer open MRI and CB with fax# to have MRI order faxed to.  Pt verbalized understanding and appreciative of call.

## 2024-10-21 ENCOUNTER — OFFICE VISIT (OUTPATIENT)
Dept: DENTISTRY | Facility: CLINIC | Age: 59
End: 2024-10-21

## 2024-10-21 VITALS — DIASTOLIC BLOOD PRESSURE: 82 MMHG | SYSTOLIC BLOOD PRESSURE: 112 MMHG | HEART RATE: 93 BPM

## 2024-10-21 DIAGNOSIS — Z98.811 DENTAL CROWNS STATUS: Primary | ICD-10-CM

## 2024-10-21 PROCEDURE — D9110 PALLIATIVE (EMERGENCY) TREATMENT OF DENTAL PAIN - MINOR PROCEDURE: HCPCS

## 2024-10-22 NOTE — DENTAL PROCEDURE DETAILS
.Limited Exam    Fariha Dale 59 y.o. female presents with self to [facility] for Limited exam  PMH reviewed, no changes, ASA II. Significant medical history: cancer of appendix 2016, HTN, pre-diabetes- last A1C 5.9 from 1/18/24, Rheumatoid arthritis. Significant allergies: metronidazole, hydrochlorothiazide, bupripion. Significant medications: see med list.    Chief complaint:  My implant crown came off    Consent:  Discussed that limited exam focuses on problem area, and same day tx is not guaranteed.  Patient explained to if they wish to have anything else evaluated, they need to return to the practice at which they are a patient of record or schedule a comprehensive exam afterwards.  Patient understands and consent was given by self via verbal consent.    Subjective history:    Onset: a month ago.   Patient is not currently in pain   Implant crown #13 came off about a month ago and patient was unable to get in sooner due to family health issues   Patient has implant crown intact with her    Objective clinical findings:   Oral cancer screening: normal.   Extraoral exam: no remarkable findings.  Intraoral exam:  Missing cement retained implant crown #13 .     Radiographs: No new radiographs, films are current.    Assessment:  #13 Implant crown de-bonded    Plan:   Re-cement implant crown #13    Patient agreed to have crown re-cemented today    Cotton rolls used for isolation  Cleaned abutment and intaglio of crown   Sandblasted intaglio of #13 zirconia crown  Placed ivoclear for 20 seconds in intaglio of #13 zirconia crown  Used Aman tape to block off screw space on abutment  Dried crown and cemented with Calibra   Tack cured and removed excess cement  Verfied contact with floss and checked occlusion and adjusted where needed  Patient felt satisfied with the occlusion    Comprehensive care disposition: encouraged patient to make a periodic exam before leaving today    Patient dismissed ambulatory and alert.    NV:  Periodic exam- patient is concerned about maxillary upper right implant bridge being mobile.    Attending: Dr. Price was present in clinic.

## 2024-10-31 ENCOUNTER — OFFICE VISIT (OUTPATIENT)
Dept: DENTISTRY | Facility: CLINIC | Age: 59
End: 2024-10-31

## 2024-10-31 VITALS — DIASTOLIC BLOOD PRESSURE: 89 MMHG | HEART RATE: 90 BPM | SYSTOLIC BLOOD PRESSURE: 123 MMHG

## 2024-10-31 DIAGNOSIS — Z91.843 RISK FOR DENTAL CARIES, HIGH: Primary | ICD-10-CM

## 2024-10-31 PROCEDURE — D0120 PERIODIC ORAL EVALUATION - ESTABLISHED PATIENT: HCPCS

## 2024-10-31 NOTE — DENTAL PROCEDURE DETAILS
PERIODIC EXAM, ADULT PROPHY , 4 BWX and PA   REVIEWED MED HX: meds, allergies, health changes reviewed in [facility]. All consents signed.  CHIEF CONCERN: I want to address my implant bridge and cavities  PAIN SCALE:  0  ASA CLASS:  ASA 2 - Patient with mild systemic disease with no functional limitations  PLAQUE:  moderate  CALCULUS: Localized  BLEEDING:  heavy  STAIN : Generalized     PERIO: Stage 2 Grade B Periodontitis  Probing depths 5 mm and below  Belkys-implantitis on implants #28, 29, 30  Discussed with patient that she needs to brush well and floss to prevent further bone loss around the lower implants    Oral Hygiene Instruction: Brushing minimum 2x daily for 2 minutes, daily flossing    Visual and Tactile Intraoral/ Extraoral evaluation: Oral and Oropharyngeal cancer evaluation. No findings     #8 Fremitus- suggested adjusting occlusion at next visit    Patient states that she was never able to make an appointment with [facility] for biopsy but she said that the staining on her cheek has stayed the same and does not cause her problems- I did not find anything unusual upon examination but I would follow up at next visit     Patient is concerned that implant bridge 4-6 is mobile when she eats  She explained that it is only on occasion when she is eating something sticky  Probing depths around 4 and 6 are WNL in comparison to last visit  Bone is integrated well and no mobility found clinically while using finger pressure or while patient biting down  I reassured patient that I do not see anything wrong clinically and I am not noticing any movement from the implants but if she has any problems in the future to give us a call  I told her that the sticky foods can cause tension and pull the implant crowns which may be why she felt some movement    Dr. Davidson Reviewed with patient clinical and radiographic findings and patient verbalized understanding. All questions and concerns addressed.     REFERRALS:  None    CARIES FINDINGS: #27 MIFL, #21 B, #24 ML, #25 MDL, #26 ML       TREATMENT  PLAN :   NV: SRPs  NV2: #27 MIFL, adjust occlusion on #8-patient has fremitus  NV3: caries control    Next Recall: 6 month recall     Last BWX: 10/31/24  Last Panorex/ FMX : 2020

## 2024-11-04 ENCOUNTER — OFFICE VISIT (OUTPATIENT)
Dept: DENTISTRY | Facility: CLINIC | Age: 59
End: 2024-11-04

## 2024-11-04 VITALS — TEMPERATURE: 98.6 F | DIASTOLIC BLOOD PRESSURE: 72 MMHG | SYSTOLIC BLOOD PRESSURE: 103 MMHG | HEART RATE: 96 BPM

## 2024-11-04 DIAGNOSIS — K02.9 DENTAL CARIES: Primary | ICD-10-CM

## 2024-11-04 PROBLEM — F33.0 MILD EPISODE OF RECURRENT MAJOR DEPRESSIVE DISORDER (HCC): Status: ACTIVE | Noted: 2022-02-21

## 2024-11-04 PROBLEM — Z63.6 CAREGIVER STRESS: Status: ACTIVE | Noted: 2024-08-12

## 2024-11-04 PROCEDURE — D2335 RESIN-BASED COMPOSITE - 4 OR MORE SURFACES OR INVOLVING INCISAL ANGLE (ANTERIOR): HCPCS | Performed by: DENTIST

## 2024-11-04 RX ORDER — TIRZEPATIDE 2.5 MG/.5ML
INJECTION, SOLUTION SUBCUTANEOUS
COMMUNITY

## 2024-11-04 NOTE — DENTAL PROCEDURE DETAILS
Patient due for next hygiene recall May 2025  Last BWs taken Oct 2024  RMH, NSC, ASA 3 - Patient with moderate systemic disease with functional limitations.  Patient reports pain level of 0.    Patient presents to [facility] for restorative treatment #27-MIFL.  EOE WNL.  IOE shows no swelling or sinus tracts.  Anesthesia: 1.0 carpule Articaine, 4% with Epinephrine 1:100,000, given via buccal Infiltration.  Isolation: Cotton roll isolation achieved with mouth prop used to stabilize teeth  Tx:  Primary caries removed from separate MIFL carious fracture originating from incisal of tooth and L(V) caries along lingual gumline. Mylar strip placed and Wedge placed. Selective etched for 12 seconds with 37% phosphoric acid and rinsed, Gluma desensitizer applied with microbrush for 30 seconds then rinsed and lightly air dried, Ivoclar Adhese Universal bond placed with VivaPen 20 second scrub, air dried until solvent fully evaporated and surface still and light cured, and restored with Tetric Evoflow and Evoceram composite shade A2.  Occlusion checked with articulation paper, Margins checked with explorer, and Contacts checked with floss. Adjusted as needed. Finished and polished.    Discussed with patient that incisal decay was fairly deep and tooth may be sensitive.  Also discussed that due to the amount of tooth structure that has been removed due caries, if this tooth develops additional caries a crown will be necessary to maintain structural integrity of tooth.  Patient understands.    Patient satisfied and dismissed alert and ambulatory.    Behavior ++, very good for local anesthesia administration.    NV: Restorative

## 2024-11-26 ENCOUNTER — OFFICE VISIT (OUTPATIENT)
Dept: DENTISTRY | Facility: CLINIC | Age: 59
End: 2024-11-26

## 2024-11-26 VITALS — HEART RATE: 93 BPM | SYSTOLIC BLOOD PRESSURE: 114 MMHG | DIASTOLIC BLOOD PRESSURE: 79 MMHG

## 2024-11-26 DIAGNOSIS — K08.50 DEFECTIVE DENTAL RESTORATION: Primary | ICD-10-CM

## 2024-11-26 PROCEDURE — D2331 RESIN-BASED COMPOSITE - 2 SURFACES, ANTERIOR: HCPCS

## 2024-11-26 NOTE — PROGRESS NOTES
Procedure Details  10 ML  - RESIN-BASED COMPOSITE - 2 SURFACES, ANTERIOR    .Composite Restoration #10 ML    Fariha Dale 59 y.o. female presents with self to [facility] for composite restoration  PMH reviewed, no changes, ASA II. Significant medical history: Cancer of Apendix 2016, HTN, Rheumatoid arthritis. Significant allergies: metronidazole, hydrochlorothiazide, Bupropion. Significant medications: none.    Diagnosis:  Lost previous restoration #10 ML  Patient states filling fell out on Friday and she is not experiencing any pain or symptoms     Prognosis:  fair    Consent:  Risks of specific procedure: need for RCT if pulp exposure occurs or in future if pulp is inflamed, need to revise tx plan based on extent of decay, damage to adjacent tooth and/or restoration.  Risks of any dental procedure: post procedural pain or sensitivity, local anesthetic side effects, allergic reaction to dental materials and medications, breakage of local anesthetic needle, aspiration of small dental tools, injury to nearby hard and soft tissues and anatomical structures.  Benefits: prevent further breakdown of tooth and its sequelae,  Alternatives: full coverage crown, no tx.  Tx plan for composite restoration #10 reviewed. Opportunity to ask questions given, all questions answered to degree of medical and dental certainty.  Patient understands and consent given by self via verbal consent.    Anesthesia:  Topical 20% benzocaine.  1 carps 2% Lidocaine 1:100k epi via buccal infiltration.    Procedure details:  Isolation: cotton rolls and high volume suction  Prepped teeth #10 with high speed handpiece.  Caries removed with round carbide on slow speed.  Band placement: mylar strip. And wooden wedge  Etch with 37% H2PO4 15 seconds. Rinsed and suctioned.  Applied  with 20 second scrub, air dried, and light cured.  Restored with packable (A2 shade) and light cured.  Checked occlusion and adjusted with finishing burs.  Checked  contacts with floss  Polished with white stone burs and enhance point.  Verified occlusion and contacts.    Patient dismissed ambulatory and alert.    NV: #24 ML and #25 MDL resins.  MAKE SURE PATIENT MAKES AN APT TO START SRPS    Attending: Dr. Chisholm checked radiograph with resident .

## 2025-03-26 ENCOUNTER — OFFICE VISIT (OUTPATIENT)
Dept: DENTISTRY | Facility: CLINIC | Age: 60
End: 2025-03-26

## 2025-03-26 VITALS — SYSTOLIC BLOOD PRESSURE: 101 MMHG | HEART RATE: 108 BPM | DIASTOLIC BLOOD PRESSURE: 69 MMHG

## 2025-03-26 DIAGNOSIS — K02.9 PRIMARY DENTAL CARIES EXTENDING INTO DENTIN: Primary | ICD-10-CM

## 2025-03-26 PROCEDURE — D2391 RESIN-BASED COMPOSITE - 1 SURFACE, POSTERIOR: HCPCS

## 2025-03-26 NOTE — PROGRESS NOTES
.Composite Restoration #21    Fariha Dale 59 y.o. female presents with self to [facility] for composite restoration  PMH reviewed, no changes, ASA II. Significant medical history: chronic pain disorder, HTN, Rheumatoid arthritis, . Significant allergies: metronidazole, hydrochlorothiazide, bupropion. Significant medications: aspirin.    Diagnosis:  Caries #21-buccal    Prognosis:  good    Consent:  Risks of specific procedure: need for RCT if pulp exposure occurs or in future if pulp is inflamed, need to revise tx plan based on extent of decay, damage to adjacent tooth and/or restoration.  Risks of any dental procedure: post procedural pain or sensitivity, local anesthetic side effects, allergic reaction to dental materials and medications, breakage of local anesthetic needle, aspiration of small dental tools, injury to nearby hard and soft tissues and anatomical structures.  Benefits: prevent further breakdown of tooth and its sequelae  Alternatives: no tx.  Tx plan for composite restoration #21 reviewed. Opportunity to ask questions given, all questions answered to degree of medical and dental certainty.  Patient understands and consent given by self via verbal consent.    Anesthesia:  Topical 20% benzocaine.  1 carps 4% Septocaine 1:100k epi via buccal infiltration.    Procedure details:  Isolation: cotton rolls and high volume suction  Prepped teeth #21 with high speed handpiece.  Caries removed with round carbide on slow speed.  Utilized caries indicator to ensure all caries removed  Placed cord size 0 soaked in hemodent  Etch with 37% H2PO4 15 seconds. Rinsed and suctioned.  Applied  with 20 second scrub, air dried, and light cured.  Restored with flowable (A2 shade) and light cured.  Removed cord  Polished with white stone burs and fine yeison     Patient dismissed ambulatory and alert.    NV: start SRPs-pt needs 4 quads.  NV2: #24 , #25     Attending: Dr. Price was present in clinic.

## 2025-05-06 ENCOUNTER — OFFICE VISIT (OUTPATIENT)
Dept: DENTISTRY | Facility: CLINIC | Age: 60
End: 2025-05-06

## 2025-05-06 VITALS — HEART RATE: 77 BPM | DIASTOLIC BLOOD PRESSURE: 71 MMHG | SYSTOLIC BLOOD PRESSURE: 113 MMHG

## 2025-05-06 DIAGNOSIS — K02.9 CARIES: Primary | ICD-10-CM

## 2025-05-06 PROCEDURE — D2332 RESIN-BASED COMPOSITE - 3 SURFACES, ANTERIOR: HCPCS

## 2025-05-06 PROCEDURE — D2335 RESIN-BASED COMPOSITE - 4 OR MORE SURFACES OR INVOLVING INCISAL ANGLE (ANTERIOR): HCPCS

## 2025-05-06 NOTE — PROGRESS NOTES
Procedure Details  24 MFL  - RESIN-BASED COMPOSITE - 3 SURFACES, ANTERIOR  25 MDFL  - RESIN-BASED COMPOSITE - 4 OR MORE SURFACES OR INVOLVING INCISAL ANGLE (ANTERIOR)    Composite Restoration #24 and #25    Fariha Dale 59 y.o. female presents with self to [facility] for composite restoration  PMH reviewed, no changes, ASA II. Significant medical history: Reviewed with pt. Significant allergies: Reviewed with pt. Significant medications: Reviewed with pt.    Diagnosis:  Caries #24-MFL and #25-MDFL    Prognosis:  fair    Consent:  Risks of specific procedure: need for RCT if pulp exposure occurs or in future if pulp is inflamed, need to revise tx plan based on extent of decay, damage to adjacent tooth and/or restoration.  Risks of any dental procedure: post procedural pain or sensitivity, local anesthetic side effects, allergic reaction to dental materials and medications, breakage of local anesthetic needle, aspiration of small dental tools, injury to nearby hard and soft tissues and anatomical structures.  Benefits: prevent further breakdown of tooth and its sequelae.  Alternatives: SDF, no tx.  Tx plan for composite restoration #24 and #25 reviewed. Opportunity to ask questions given, all questions answered to degree of medical and dental certainty.  Patient understands and consent given by self via verbal consent.    Anesthesia:  Topical 20% benzocaine.  1 carps 2% Lidocaine 1:100k epi via buccal infiltration.    Procedure details:  Isolation: cotton rolls and high volume suction  Prepped teeth #24 and #25 with high speed handpiece.  Caries removed with round carbide on slow speed.  Band placement: mylar strip and wedge.   Etch with 37% H2PO4 15 seconds. Rinsed and suctioned.  Applied  with 20 second scrub, air dried, and light cured.  Restored with packable (A2 shade) and light cured.  Checked occlusion and adjusted with finishing burs.  Checked contacts with floss  Polished with enhance  point.  Verified occlusion and contacts.    Patient dismissed ambulatory and alert.    NV: Resin #26-MFL.    Attending: Dr. Chisholm was present in clinic.

## 2025-06-27 ENCOUNTER — OFFICE VISIT (OUTPATIENT)
Dept: DENTISTRY | Facility: CLINIC | Age: 60
End: 2025-06-27

## 2025-06-27 VITALS — SYSTOLIC BLOOD PRESSURE: 119 MMHG | DIASTOLIC BLOOD PRESSURE: 74 MMHG | HEART RATE: 79 BPM

## 2025-06-27 DIAGNOSIS — K02.9 DENTAL CARIES: Primary | ICD-10-CM

## 2025-06-27 PROCEDURE — D2332 RESIN-BASED COMPOSITE - 3 SURFACES, ANTERIOR: HCPCS

## 2025-07-01 NOTE — PROGRESS NOTES
Procedure Details  26 [facility]  - RESIN-BASED COMPOSITE - 3 SURFACES, ANTERIOR    Composite Restoration #26-MFL    Fariha Dale 59 y.o. female presents with self to [facility] for composite restoration  PMH reviewed, no changes, ASA II.   Diagnosis:  Caries #26-MFL    Prognosis:  good    Consent:  Risks of specific procedure: need for RCT if pulp exposure occurs or in future if pulp is inflamed, need to revise tx plan based on extent of decay, damage to adjacent tooth and/or restoration.  Risks of any dental procedure: post procedural pain or sensitivity, local anesthetic side effects, allergic reaction to dental materials and medications, breakage of local anesthetic needle, aspiration of small dental tools, injury to nearby hard and soft tissues and anatomical structures.  Benefits: prevent further breakdown of tooth and its sequelae.  Alternatives: no tx.  Tx plan for composite restoration #26-MFL reviewed. Opportunity to ask questions given, all questions answered to degree of medical and dental certainty.  Patient understands and consent given by self via verbal consent.    Anesthesia:  Topical 20% benzocaine.  1 carps 2% Lidocaine 1:100k epi via buccal infiltration.    Procedure details:  Isolation: cotton rolls and high volume suction  Prepped teeth #26-MFL with high speed handpiece.  Caries removed with round carbide on slow speed.  Band placement: mylar strip.   Etch with 37% H2PO4 15 seconds. Rinsed and suctioned.  Applied  with 20 second scrub, air dried, and light cured.  Restored with packable and flowable (A2 shade) and light cured.  Checked occlusion and adjusted with finishing burs.  Checked contacts with floss  Polished with enhance point.  Verified occlusion and contacts.    Patient dismissed ambulatory and alert.    NV: srp.    Attending: Dr. Price was present in clinic.

## 2025-07-09 ENCOUNTER — OFFICE VISIT (OUTPATIENT)
Dept: DENTISTRY | Facility: CLINIC | Age: 60
End: 2025-07-09

## 2025-07-09 DIAGNOSIS — Z01.21 ENCOUNTER FOR DENTAL EXAMINATION AND CLEANING WITH ABNORMAL FINDINGS: Primary | ICD-10-CM

## 2025-07-09 PROCEDURE — D4341 PERIODONTAL SCALING AND ROOT PLANING - 4 OR MORE TEETH PER QUADRANT: HCPCS

## 2025-07-09 PROCEDURE — D4342 PERIODONTAL SCALING AND ROOT PLANING - 1 TO 3 TEETH PER QUADRANT: HCPCS

## 2025-07-09 NOTE — PROGRESS NOTES
Procedure Details  20,21  - PERIODONTAL SCALING AND ROOT PLANING - 1 TO 3 TEETH PER QUADRANT  UL  - PERIODONTAL SCALING AND ROOT PLANING - 4 OR MORE TEETH PER QUADRANT    SCALE AND RP UL and LL   Local anesthesia administered by Cassandra Dove [facility]     2 carpule/s given - 2% Lidocaine 1:100K epi infiltration  CHIEF CONCERN: none   PAIN SCALE: 0  ASA CLASS: ASA 2 - Patient with mild systemic disease with no functional limitations  PLAQUE: moderate  CALCULUS: Moderate  BLEEDING: moderate  STAIN : Generalized  Generalized demineralization     Hand scaled, polished and flossed. Used cavitron    Oral Hygiene Instruction:  recommended brushing 2 x daily for 2 minutes MIN, recommended flossing daily, reviewed dietary precautions. Post op sc/rp instructions placed in AVS, printed and handed/ reviewed with patient.     Soft tissue exam:  soft tissue exam was normal  ExtraOral exam:   Extraoral exam was normal    REFERRALS: no referrals provided         NEXT VISIT:   --->sc/rp UR and sc/rp LR  Nv: filling #9     Last BWX: 10/31/2024  Last Panorex/ FMX : 4/22//2025

## 2025-07-09 NOTE — DENTAL PROCEDURE DETAILS
SCALE AND RP UL and LL   Local anesthesia administered by Cassandra Dove [facility]     2 carpule/s given - 2% Lidocaine 1:100K epi infiltration  CHIEF CONCERN: none   PAIN SCALE: 0  ASA CLASS: ASA 2 - Patient with mild systemic disease with no functional limitations  PLAQUE: moderate  CALCULUS: Moderate  BLEEDING: moderate  STAIN : Generalized  Generalized demineralization     Hand scaled, polished and flossed. Used cavitron    Oral Hygiene Instruction:  recommended brushing 2 x daily for 2 minutes MIN, recommended flossing daily, reviewed dietary precautions. Post op sc/rp instructions placed in AVS, printed and handed/ reviewed with patient.     Soft tissue exam:  soft tissue exam was normal  ExtraOral exam:   Extraoral exam was normal    REFERRALS: no referrals provided         NEXT VISIT:   --->sc/rp UR and sc/rp LR  Nv: filling #9     Last BWX: 10/31/2024  Last Panorex/ FMX : 4/22//2025

## 2025-07-15 ENCOUNTER — OFFICE VISIT (OUTPATIENT)
Dept: DENTISTRY | Facility: CLINIC | Age: 60
End: 2025-07-15

## 2025-07-15 DIAGNOSIS — Z01.21 ENCOUNTER FOR DENTAL EXAMINATION AND CLEANING WITH ABNORMAL FINDINGS: Primary | ICD-10-CM

## 2025-07-15 PROCEDURE — D4342 PERIODONTAL SCALING AND ROOT PLANING - 1 TO 3 TEETH PER QUADRANT: HCPCS

## 2025-07-16 ENCOUNTER — OFFICE VISIT (OUTPATIENT)
Dept: DENTISTRY | Facility: CLINIC | Age: 60
End: 2025-07-16

## 2025-07-16 DIAGNOSIS — K02.9 CARIES: ICD-10-CM

## 2025-07-16 DIAGNOSIS — K02.9: Primary | ICD-10-CM

## 2025-07-16 PROCEDURE — D2331 RESIN-BASED COMPOSITE - 2 SURFACES, ANTERIOR: HCPCS

## 2025-07-16 NOTE — PROGRESS NOTES
Composite Restoration #27 DF    Fariha Dale 59 y.o. female presents with self to [facility] for composite restoration  PMH reviewed, no changes, ASA II. Significant medical history: reviewed. Significant allergies: reviewed. Significant medications: reviewed.    Diagnosis:  Caries #27 DF    Prognosis:  fair    Consent:  Risks of specific procedure: need for RCT if pulp exposure occurs or in future if pulp is inflamed, need to revise tx plan based on extent of decay, damage to adjacent tooth and/or restoration.  Risks of any dental procedure: post procedural pain or sensitivity, local anesthetic side effects, allergic reaction to dental materials and medications, breakage of local anesthetic needle, aspiration of small dental tools, injury to nearby hard and soft tissues and anatomical structures.  Benefits: prevent further breakdown of tooth and its sequelae.  Alternatives: no tx.  Tx plan for composite restoration #27 reviewed. Opportunity to ask questions given, all questions answered to degree of medical and dental certainty.  Patient understands and consent given by self via verbal consent.    Anesthesia:  Topical 20% benzocaine.  1 carps 4% Septocaine 1:100k epi via buccal infiltration.    Procedure details:  Isolation: cotton rolls and high volume suction  Prepped teeth #27 with high speed handpiece.  Caries removed with round carbide on slow speed.  Band placement: mylar strip.   Etch with 37% H2PO4 15 seconds. Rinsed and suctioned.  Applied  with 20 second scrub, air dried, and light cured.  Restored with packable (A2 shade) and light cured.  Adjusted with finishing burs.  Checked contacts with floss  Polished with enhance point.  Verified occlusion and contacts.    Patient dismissed ambulatory and alert.    NV: #9 ML composite restoration with any resident for 90 minutes.    Attending: Dr. Price was present in clinic.